# Patient Record
Sex: FEMALE | Race: OTHER | HISPANIC OR LATINO
[De-identification: names, ages, dates, MRNs, and addresses within clinical notes are randomized per-mention and may not be internally consistent; named-entity substitution may affect disease eponyms.]

---

## 2017-08-30 ENCOUNTER — RESULT REVIEW (OUTPATIENT)
Age: 32
End: 2017-08-30

## 2017-08-30 ENCOUNTER — TRANSCRIPTION ENCOUNTER (OUTPATIENT)
Age: 32
End: 2017-08-30

## 2017-08-30 ENCOUNTER — OUTPATIENT (OUTPATIENT)
Dept: OUTPATIENT SERVICES | Facility: HOSPITAL | Age: 32
LOS: 1 days | Discharge: HOME | End: 2017-08-30
Payer: COMMERCIAL

## 2017-08-30 VITALS
WEIGHT: 167.99 LBS | TEMPERATURE: 99 F | SYSTOLIC BLOOD PRESSURE: 117 MMHG | HEIGHT: 64 IN | HEART RATE: 82 BPM | RESPIRATION RATE: 12 BRPM | OXYGEN SATURATION: 100 % | DIASTOLIC BLOOD PRESSURE: 62 MMHG

## 2017-08-30 VITALS
RESPIRATION RATE: 12 BRPM | SYSTOLIC BLOOD PRESSURE: 107 MMHG | DIASTOLIC BLOOD PRESSURE: 71 MMHG | HEART RATE: 74 BPM | OXYGEN SATURATION: 97 %

## 2017-08-30 DIAGNOSIS — Z98.82 BREAST IMPLANT STATUS: Chronic | ICD-10-CM

## 2017-08-30 DIAGNOSIS — E89.0 POSTPROCEDURAL HYPOTHYROIDISM: Chronic | ICD-10-CM

## 2017-08-30 DIAGNOSIS — O02.1 MISSED ABORTION: ICD-10-CM

## 2017-08-30 LAB
BLD GP AB SCN SERPL QL: SIGNIFICANT CHANGE UP
HCT VFR BLD CALC: 39 % — SIGNIFICANT CHANGE UP (ref 34.5–45)
HGB BLD-MCNC: 13.2 G/DL — SIGNIFICANT CHANGE UP (ref 11.5–15.5)
MCHC RBC-ENTMCNC: 31.4 PG — SIGNIFICANT CHANGE UP (ref 27–34)
MCHC RBC-ENTMCNC: 33.7 GM/DL — SIGNIFICANT CHANGE UP (ref 32–36)
MCV RBC AUTO: 93.2 FL — SIGNIFICANT CHANGE UP (ref 80–100)
PLATELET # BLD AUTO: 253 K/UL — SIGNIFICANT CHANGE UP (ref 150–400)
RBC # BLD: 4.19 M/UL — SIGNIFICANT CHANGE UP (ref 3.8–5.2)
RBC # FLD: 11.6 % — SIGNIFICANT CHANGE UP (ref 10.3–14.5)
WBC # BLD: 6.7 K/UL — SIGNIFICANT CHANGE UP (ref 3.8–10.5)
WBC # FLD AUTO: 6.7 K/UL — SIGNIFICANT CHANGE UP (ref 3.8–10.5)

## 2017-08-30 PROCEDURE — 88300 SURGICAL PATH GROSS: CPT | Mod: 26,59

## 2017-08-30 PROCEDURE — 88305 TISSUE EXAM BY PATHOLOGIST: CPT | Mod: 26

## 2017-08-30 RX ORDER — OXYCODONE HYDROCHLORIDE 5 MG/1
5 TABLET ORAL EVERY 4 HOURS
Qty: 0 | Refills: 0 | Status: DISCONTINUED | OUTPATIENT
Start: 2017-08-30 | End: 2017-08-30

## 2017-08-30 RX ORDER — MEPERIDINE HYDROCHLORIDE 50 MG/ML
12.5 INJECTION INTRAMUSCULAR; INTRAVENOUS; SUBCUTANEOUS
Qty: 0 | Refills: 0 | Status: DISCONTINUED | OUTPATIENT
Start: 2017-08-30 | End: 2017-08-30

## 2017-08-30 RX ORDER — ONDANSETRON 8 MG/1
4 TABLET, FILM COATED ORAL ONCE
Qty: 0 | Refills: 0 | Status: DISCONTINUED | OUTPATIENT
Start: 2017-08-30 | End: 2017-09-12

## 2017-08-30 RX ORDER — SODIUM CHLORIDE 9 MG/ML
1000 INJECTION, SOLUTION INTRAVENOUS
Qty: 0 | Refills: 0 | Status: DISCONTINUED | OUTPATIENT
Start: 2017-08-30 | End: 2017-09-12

## 2017-08-30 RX ORDER — METOCLOPRAMIDE HCL 10 MG
10 TABLET ORAL ONCE
Qty: 0 | Refills: 0 | Status: DISCONTINUED | OUTPATIENT
Start: 2017-08-30 | End: 2017-09-12

## 2017-08-30 RX ORDER — HYDROMORPHONE HYDROCHLORIDE 2 MG/ML
0.5 INJECTION INTRAMUSCULAR; INTRAVENOUS; SUBCUTANEOUS
Qty: 0 | Refills: 0 | Status: DISCONTINUED | OUTPATIENT
Start: 2017-08-30 | End: 2017-08-30

## 2017-08-30 RX ADMIN — SODIUM CHLORIDE 75 MILLILITER(S): 9 INJECTION, SOLUTION INTRAVENOUS at 09:50

## 2017-08-30 RX ADMIN — HYDROMORPHONE HYDROCHLORIDE 0.5 MILLIGRAM(S): 2 INJECTION INTRAMUSCULAR; INTRAVENOUS; SUBCUTANEOUS at 12:33

## 2017-08-30 RX ADMIN — HYDROMORPHONE HYDROCHLORIDE 0.5 MILLIGRAM(S): 2 INJECTION INTRAMUSCULAR; INTRAVENOUS; SUBCUTANEOUS at 12:43

## 2017-08-30 NOTE — ASU DISCHARGE PLAN (ADULT/PEDIATRIC). - MEDICATION SUMMARY - MEDICATIONS TO TAKE
I will START or STAY ON the medications listed below when I get home from the hospital:    Motrin 800 mg oral tablet  -- 1 tab(s) by mouth 3 times a day, As Needed  -- Indication: For pain med    Tylenol 500 mg oral tablet  -- 2 tab(s) by mouth every 8 hours, As Needed  -- Indication: For pain med    Synthroid 200 mcg (0.2 mg) oral tablet  -- 1 tab(s) by mouth once a day  -- Indication: For Home med

## 2017-08-30 NOTE — ASU DISCHARGE PLAN (ADULT/PEDIATRIC). - ACTIVITY LEVEL
no douching/no sports/gym/no tampons/no tub baths/no exercise/no heavy lifting/no intercourse/nothing per vagina

## 2017-08-30 NOTE — ASU DISCHARGE PLAN (ADULT/PEDIATRIC). - INSTRUCTIONS
****Call the office with any problems including but not limited to heavy vaginal bleeding, fevers, severe abdominal pain, inability to eat/drink/urinate  **** Nothing in the vagina x2 weeks-( No sex, tampons, douching )  *****You may shower as usual but no hot tubs, bath tubs, swimming pools x2 weeks.  for urgent post op care please contact Narcisa at Dr. Grace's surgical hotline  [phone]

## 2017-08-30 NOTE — BRIEF OPERATIVE NOTE - PROCEDURE
D&C, therapeutic, for incomp, missed, septic, or induced , 1st trimester  2017    Active  [facility]1

## 2017-08-30 NOTE — ASU DISCHARGE PLAN (ADULT/PEDIATRIC). - NOTIFY
Unable to Urinate/Pain not relieved by Medications/Bleeding that does not stop/Inability to Tolerate Liquids or Foods/Fever greater than 101/Persistent Nausea and Vomiting/GYN Fever>100.4/Excessive Diarrhea

## 2017-08-31 LAB — SURGICAL PATHOLOGY FINAL REPORT - CH: SIGNIFICANT CHANGE UP

## 2017-09-01 DIAGNOSIS — O02.1 MISSED ABORTION: ICD-10-CM

## 2017-09-01 DIAGNOSIS — E03.9 HYPOTHYROIDISM, UNSPECIFIED: ICD-10-CM

## 2017-09-01 LAB — CHROM ANALY OVERALL INTERP SPEC-IMP: SIGNIFICANT CHANGE UP

## 2017-09-11 PROCEDURE — 88300 SURGICAL PATH GROSS: CPT

## 2017-09-11 PROCEDURE — 86850 RBC ANTIBODY SCREEN: CPT

## 2017-09-11 PROCEDURE — 85027 COMPLETE CBC AUTOMATED: CPT

## 2017-09-11 PROCEDURE — 86900 BLOOD TYPING SEROLOGIC ABO: CPT

## 2017-09-11 PROCEDURE — 88305 TISSUE EXAM BY PATHOLOGIST: CPT

## 2017-09-11 PROCEDURE — 59820 CARE OF MISCARRIAGE: CPT

## 2017-09-11 PROCEDURE — 86901 BLOOD TYPING SEROLOGIC RH(D): CPT

## 2018-04-17 PROBLEM — Z00.00 ENCOUNTER FOR PREVENTIVE HEALTH EXAMINATION: Noted: 2018-04-17

## 2018-04-30 ENCOUNTER — ASOB RESULT (OUTPATIENT)
Age: 33
End: 2018-04-30

## 2018-04-30 ENCOUNTER — APPOINTMENT (OUTPATIENT)
Dept: ANTEPARTUM | Facility: CLINIC | Age: 33
End: 2018-04-30
Payer: COMMERCIAL

## 2018-04-30 PROCEDURE — 36416 COLLJ CAPILLARY BLOOD SPEC: CPT

## 2018-04-30 PROCEDURE — 76801 OB US < 14 WKS SINGLE FETUS: CPT

## 2018-04-30 PROCEDURE — 76813 OB US NUCHAL MEAS 1 GEST: CPT

## 2018-06-25 ENCOUNTER — ASOB RESULT (OUTPATIENT)
Age: 33
End: 2018-06-25

## 2018-06-25 ENCOUNTER — APPOINTMENT (OUTPATIENT)
Dept: ANTEPARTUM | Facility: CLINIC | Age: 33
End: 2018-06-25
Payer: COMMERCIAL

## 2018-06-25 PROCEDURE — 76811 OB US DETAILED SNGL FETUS: CPT

## 2018-10-09 ENCOUNTER — OUTPATIENT (OUTPATIENT)
Dept: OUTPATIENT SERVICES | Facility: HOSPITAL | Age: 33
LOS: 1 days | End: 2018-10-09

## 2018-10-09 DIAGNOSIS — E89.0 POSTPROCEDURAL HYPOTHYROIDISM: Chronic | ICD-10-CM

## 2018-10-09 DIAGNOSIS — O26.899 OTHER SPECIFIED PREGNANCY RELATED CONDITIONS, UNSPECIFIED TRIMESTER: ICD-10-CM

## 2018-10-09 DIAGNOSIS — Z98.82 BREAST IMPLANT STATUS: Chronic | ICD-10-CM

## 2018-10-09 DIAGNOSIS — Z3A.00 WEEKS OF GESTATION OF PREGNANCY NOT SPECIFIED: ICD-10-CM

## 2018-10-09 LAB — AMNISURE ROM (RUPTURE OF MEMBRANES): NEGATIVE — SIGNIFICANT CHANGE UP

## 2018-11-13 ENCOUNTER — TRANSCRIPTION ENCOUNTER (OUTPATIENT)
Age: 33
End: 2018-11-13

## 2018-11-13 ENCOUNTER — INPATIENT (INPATIENT)
Facility: HOSPITAL | Age: 33
LOS: 3 days | Discharge: HOME | End: 2018-11-17
Attending: OBSTETRICS & GYNECOLOGY | Admitting: OBSTETRICS & GYNECOLOGY

## 2018-11-13 VITALS — WEIGHT: 164.91 LBS | HEIGHT: 65 IN

## 2018-11-13 DIAGNOSIS — Z98.82 BREAST IMPLANT STATUS: Chronic | ICD-10-CM

## 2018-11-13 DIAGNOSIS — O26.899 OTHER SPECIFIED PREGNANCY RELATED CONDITIONS, UNSPECIFIED TRIMESTER: ICD-10-CM

## 2018-11-13 DIAGNOSIS — Z3A.00 WEEKS OF GESTATION OF PREGNANCY NOT SPECIFIED: ICD-10-CM

## 2018-11-13 DIAGNOSIS — E89.0 POSTPROCEDURAL HYPOTHYROIDISM: Chronic | ICD-10-CM

## 2018-11-13 PROBLEM — E03.9 HYPOTHYROIDISM, UNSPECIFIED: Chronic | Status: ACTIVE | Noted: 2017-08-30

## 2018-11-13 LAB
BASOPHILS # BLD AUTO: 0.03 K/UL — SIGNIFICANT CHANGE UP (ref 0–0.2)
BASOPHILS NFR BLD AUTO: 0.3 % — SIGNIFICANT CHANGE UP (ref 0–2)
EOSINOPHIL # BLD AUTO: 0.13 K/UL — SIGNIFICANT CHANGE UP (ref 0–0.5)
EOSINOPHIL NFR BLD AUTO: 1.4 % — SIGNIFICANT CHANGE UP (ref 0–6)
HCT VFR BLD CALC: 36.7 % — SIGNIFICANT CHANGE UP (ref 34.5–45)
HGB BLD-MCNC: 12.7 G/DL — SIGNIFICANT CHANGE UP (ref 11.5–15.5)
IMM GRANULOCYTES # BLD AUTO: 0.03 # — SIGNIFICANT CHANGE UP
IMM GRANULOCYTES NFR BLD AUTO: 0.3 % — SIGNIFICANT CHANGE UP (ref 0–1.5)
LYMPHOCYTES # BLD AUTO: 1.75 K/UL — SIGNIFICANT CHANGE UP (ref 1–3.3)
LYMPHOCYTES # BLD AUTO: 18.5 % — SIGNIFICANT CHANGE UP (ref 13–44)
MCHC RBC-ENTMCNC: 31.4 PG — SIGNIFICANT CHANGE UP (ref 27–34)
MCHC RBC-ENTMCNC: 34.6 % — SIGNIFICANT CHANGE UP (ref 32–36)
MCV RBC AUTO: 90.8 FL — SIGNIFICANT CHANGE UP (ref 80–100)
MONOCYTES # BLD AUTO: 0.4 K/UL — SIGNIFICANT CHANGE UP (ref 0–0.9)
MONOCYTES NFR BLD AUTO: 4.2 % — SIGNIFICANT CHANGE UP (ref 2–14)
NEUTROPHILS # BLD AUTO: 7.13 K/UL — SIGNIFICANT CHANGE UP (ref 1.8–7.4)
NEUTROPHILS NFR BLD AUTO: 75.3 % — SIGNIFICANT CHANGE UP (ref 43–77)
NRBC # FLD: 0 — SIGNIFICANT CHANGE UP
PLATELET # BLD AUTO: 187 K/UL — SIGNIFICANT CHANGE UP (ref 150–400)
PMV BLD: 11.4 FL — SIGNIFICANT CHANGE UP (ref 7–13)
RBC # BLD: 4.04 M/UL — SIGNIFICANT CHANGE UP (ref 3.8–5.2)
RBC # FLD: 12.1 % — SIGNIFICANT CHANGE UP (ref 10.3–14.5)
WBC # BLD: 9.47 K/UL — SIGNIFICANT CHANGE UP (ref 3.8–10.5)
WBC # FLD AUTO: 9.47 K/UL — SIGNIFICANT CHANGE UP (ref 3.8–10.5)

## 2018-11-13 RX ORDER — SODIUM CHLORIDE 9 MG/ML
1000 INJECTION, SOLUTION INTRAVENOUS ONCE
Qty: 0 | Refills: 0 | Status: DISCONTINUED | OUTPATIENT
Start: 2018-11-13 | End: 2018-11-13

## 2018-11-13 RX ORDER — OXYTOCIN 10 UNIT/ML
333.3 VIAL (ML) INJECTION
Qty: 20 | Refills: 0 | Status: DISCONTINUED | OUTPATIENT
Start: 2018-11-13 | End: 2018-11-13

## 2018-11-13 RX ORDER — CITRIC ACID/SODIUM CITRATE 300-500 MG
15 SOLUTION, ORAL ORAL EVERY 4 HOURS
Qty: 0 | Refills: 0 | Status: DISCONTINUED | OUTPATIENT
Start: 2018-11-13 | End: 2018-11-13

## 2018-11-13 RX ORDER — INFLUENZA VIRUS VACCINE 15; 15; 15; 15 UG/.5ML; UG/.5ML; UG/.5ML; UG/.5ML
0.5 SUSPENSION INTRAMUSCULAR ONCE
Qty: 0 | Refills: 0 | Status: COMPLETED | OUTPATIENT
Start: 2018-11-13 | End: 2018-11-17

## 2018-11-13 RX ORDER — LEVOTHYROXINE SODIUM 125 MCG
200 TABLET ORAL DAILY
Qty: 0 | Refills: 0 | Status: DISCONTINUED | OUTPATIENT
Start: 2018-11-13 | End: 2018-11-14

## 2018-11-13 RX ORDER — SODIUM CHLORIDE 9 MG/ML
1000 INJECTION, SOLUTION INTRAVENOUS
Qty: 0 | Refills: 0 | Status: DISCONTINUED | OUTPATIENT
Start: 2018-11-13 | End: 2018-11-13

## 2018-11-13 RX ORDER — SODIUM CHLORIDE 9 MG/ML
1000 INJECTION, SOLUTION INTRAVENOUS ONCE
Qty: 0 | Refills: 0 | Status: COMPLETED | OUTPATIENT
Start: 2018-11-13 | End: 2018-11-14

## 2018-11-13 RX ORDER — CITRIC ACID/SODIUM CITRATE 300-500 MG
15 SOLUTION, ORAL ORAL EVERY 4 HOURS
Qty: 0 | Refills: 0 | Status: DISCONTINUED | OUTPATIENT
Start: 2018-11-13 | End: 2018-11-14

## 2018-11-13 RX ORDER — SODIUM CHLORIDE 9 MG/ML
1000 INJECTION, SOLUTION INTRAVENOUS
Qty: 0 | Refills: 0 | Status: DISCONTINUED | OUTPATIENT
Start: 2018-11-13 | End: 2018-11-14

## 2018-11-13 RX ORDER — OXYTOCIN 10 UNIT/ML
333.3 VIAL (ML) INJECTION
Qty: 20 | Refills: 0 | Status: DISCONTINUED | OUTPATIENT
Start: 2018-11-13 | End: 2018-11-14

## 2018-11-13 RX ORDER — LEVOTHYROXINE SODIUM 125 MCG
100 TABLET ORAL DAILY
Qty: 0 | Refills: 0 | Status: DISCONTINUED | OUTPATIENT
Start: 2018-11-13 | End: 2018-11-13

## 2018-11-13 RX ADMIN — SODIUM CHLORIDE 125 MILLILITER(S): 9 INJECTION, SOLUTION INTRAVENOUS at 17:13

## 2018-11-13 RX ADMIN — SODIUM CHLORIDE 125 MILLILITER(S): 9 INJECTION, SOLUTION INTRAVENOUS at 23:07

## 2018-11-13 NOTE — PATIENT PROFILE OB - AS DELIV COMPLICATIONS OB
prolonged rupture of membranes/meconium stained fluid/premature rupture of membranes prior to labor/abnormal fetal heart rate tracing

## 2018-11-14 ENCOUNTER — TRANSCRIPTION ENCOUNTER (OUTPATIENT)
Age: 33
End: 2018-11-14

## 2018-11-14 LAB
RH IG SCN BLD-IMP: POSITIVE — SIGNIFICANT CHANGE UP
T PALLIDUM AB TITR SER: NEGATIVE — SIGNIFICANT CHANGE UP

## 2018-11-14 RX ORDER — LEVOTHYROXINE SODIUM 125 MCG
200 TABLET ORAL DAILY
Qty: 0 | Refills: 0 | Status: DISCONTINUED | OUTPATIENT
Start: 2018-11-14 | End: 2018-11-14

## 2018-11-14 RX ORDER — OXYTOCIN 10 UNIT/ML
2 VIAL (ML) INJECTION
Qty: 30 | Refills: 0 | Status: DISCONTINUED | OUTPATIENT
Start: 2018-11-14 | End: 2018-11-14

## 2018-11-14 RX ORDER — FERROUS SULFATE 325(65) MG
325 TABLET ORAL DAILY
Qty: 0 | Refills: 0 | Status: DISCONTINUED | OUTPATIENT
Start: 2018-11-15 | End: 2018-11-17

## 2018-11-14 RX ORDER — OXYCODONE HYDROCHLORIDE 5 MG/1
5 TABLET ORAL
Qty: 0 | Refills: 0 | Status: COMPLETED | OUTPATIENT
Start: 2018-11-15 | End: 2018-11-22

## 2018-11-14 RX ORDER — GLYCERIN ADULT
1 SUPPOSITORY, RECTAL RECTAL AT BEDTIME
Qty: 0 | Refills: 0 | Status: DISCONTINUED | OUTPATIENT
Start: 2018-11-15 | End: 2018-11-17

## 2018-11-14 RX ORDER — ACETAMINOPHEN 500 MG
975 TABLET ORAL EVERY 6 HOURS
Qty: 0 | Refills: 0 | Status: DISCONTINUED | OUTPATIENT
Start: 2018-11-15 | End: 2018-11-17

## 2018-11-14 RX ORDER — DOCUSATE SODIUM 100 MG
100 CAPSULE ORAL
Qty: 0 | Refills: 0 | Status: DISCONTINUED | OUTPATIENT
Start: 2018-11-15 | End: 2018-11-17

## 2018-11-14 RX ORDER — HYDROMORPHONE HYDROCHLORIDE 2 MG/ML
0.5 INJECTION INTRAMUSCULAR; INTRAVENOUS; SUBCUTANEOUS
Qty: 0 | Refills: 0 | Status: DISCONTINUED | OUTPATIENT
Start: 2018-11-14 | End: 2018-11-14

## 2018-11-14 RX ORDER — LANOLIN
1 OINTMENT (GRAM) TOPICAL
Qty: 0 | Refills: 0 | Status: DISCONTINUED | OUTPATIENT
Start: 2018-11-15 | End: 2018-11-17

## 2018-11-14 RX ORDER — SIMETHICONE 80 MG/1
80 TABLET, CHEWABLE ORAL EVERY 4 HOURS
Qty: 0 | Refills: 0 | Status: DISCONTINUED | OUTPATIENT
Start: 2018-11-15 | End: 2018-11-17

## 2018-11-14 RX ORDER — OXYCODONE HYDROCHLORIDE 5 MG/1
5 TABLET ORAL EVERY 4 HOURS
Qty: 0 | Refills: 0 | Status: COMPLETED | OUTPATIENT
Start: 2018-11-15 | End: 2018-11-22

## 2018-11-14 RX ORDER — SODIUM CHLORIDE 9 MG/ML
1000 INJECTION, SOLUTION INTRAVENOUS ONCE
Qty: 0 | Refills: 0 | Status: COMPLETED | OUTPATIENT
Start: 2018-11-14 | End: 2018-11-14

## 2018-11-14 RX ORDER — TETANUS TOXOID, REDUCED DIPHTHERIA TOXOID AND ACELLULAR PERTUSSIS VACCINE, ADSORBED 5; 2.5; 8; 8; 2.5 [IU]/.5ML; [IU]/.5ML; UG/.5ML; UG/.5ML; UG/.5ML
0.5 SUSPENSION INTRAMUSCULAR ONCE
Qty: 0 | Refills: 0 | Status: DISCONTINUED | OUTPATIENT
Start: 2018-11-15 | End: 2018-11-17

## 2018-11-14 RX ORDER — KETOROLAC TROMETHAMINE 30 MG/ML
30 SYRINGE (ML) INJECTION EVERY 6 HOURS
Qty: 0 | Refills: 0 | Status: DISCONTINUED | OUTPATIENT
Start: 2018-11-15 | End: 2018-11-16

## 2018-11-14 RX ORDER — DIPHENHYDRAMINE HCL 50 MG
25 CAPSULE ORAL EVERY 6 HOURS
Qty: 0 | Refills: 0 | Status: DISCONTINUED | OUTPATIENT
Start: 2018-11-15 | End: 2018-11-17

## 2018-11-14 RX ORDER — BUTORPHANOL TARTRATE 2 MG/ML
2 INJECTION, SOLUTION INTRAMUSCULAR; INTRAVENOUS ONCE
Qty: 0 | Refills: 0 | Status: DISCONTINUED | OUTPATIENT
Start: 2018-11-14 | End: 2018-11-14

## 2018-11-14 RX ORDER — DIPHENOXYLATE HCL/ATROPINE 2.5-.025MG
2 TABLET ORAL ONCE
Qty: 0 | Refills: 0 | Status: DISCONTINUED | OUTPATIENT
Start: 2018-11-14 | End: 2018-11-14

## 2018-11-14 RX ORDER — HEPARIN SODIUM 5000 [USP'U]/ML
5000 INJECTION INTRAVENOUS; SUBCUTANEOUS EVERY 12 HOURS
Qty: 0 | Refills: 0 | Status: DISCONTINUED | OUTPATIENT
Start: 2018-11-15 | End: 2018-11-17

## 2018-11-14 RX ORDER — IBUPROFEN 200 MG
600 TABLET ORAL EVERY 6 HOURS
Qty: 0 | Refills: 0 | Status: COMPLETED | OUTPATIENT
Start: 2018-11-15 | End: 2019-10-14

## 2018-11-14 RX ORDER — OXYTOCIN 10 UNIT/ML
41.67 VIAL (ML) INJECTION
Qty: 20 | Refills: 0 | Status: DISCONTINUED | OUTPATIENT
Start: 2018-11-14 | End: 2018-11-15

## 2018-11-14 RX ADMIN — BUTORPHANOL TARTRATE 2 MILLIGRAM(S): 2 INJECTION, SOLUTION INTRAMUSCULAR; INTRAVENOUS at 05:29

## 2018-11-14 RX ADMIN — SODIUM CHLORIDE 2000 MILLILITER(S): 9 INJECTION, SOLUTION INTRAVENOUS at 07:00

## 2018-11-14 RX ADMIN — Medication 200 MICROGRAM(S): at 08:04

## 2018-11-14 RX ADMIN — BUTORPHANOL TARTRATE 2 MILLIGRAM(S): 2 INJECTION, SOLUTION INTRAMUSCULAR; INTRAVENOUS at 04:47

## 2018-11-14 RX ADMIN — HYDROMORPHONE HYDROCHLORIDE 0.5 MILLIGRAM(S): 2 INJECTION INTRAMUSCULAR; INTRAVENOUS; SUBCUTANEOUS at 23:51

## 2018-11-14 RX ADMIN — Medication 2 MILLIUNIT(S)/MIN: at 11:11

## 2018-11-14 RX ADMIN — SODIUM CHLORIDE 2000 MILLILITER(S): 9 INJECTION, SOLUTION INTRAVENOUS at 21:00

## 2018-11-14 RX ADMIN — Medication 125 MILLIUNIT(S)/MIN: at 23:15

## 2018-11-14 RX ADMIN — Medication 2 TABLET(S): at 20:50

## 2018-11-15 LAB
HCT VFR BLD CALC: 29.8 % — LOW (ref 34.5–45)
HCT VFR BLD CALC: 35.6 % — SIGNIFICANT CHANGE UP (ref 34.5–45)
HGB BLD-MCNC: 10.3 G/DL — LOW (ref 11.5–15.5)
HGB BLD-MCNC: 12.1 G/DL — SIGNIFICANT CHANGE UP (ref 11.5–15.5)
MCHC RBC-ENTMCNC: 31.3 PG — SIGNIFICANT CHANGE UP (ref 27–34)
MCHC RBC-ENTMCNC: 32 PG — SIGNIFICANT CHANGE UP (ref 27–34)
MCHC RBC-ENTMCNC: 34 % — SIGNIFICANT CHANGE UP (ref 32–36)
MCHC RBC-ENTMCNC: 34.6 % — SIGNIFICANT CHANGE UP (ref 32–36)
MCV RBC AUTO: 92 FL — SIGNIFICANT CHANGE UP (ref 80–100)
MCV RBC AUTO: 92.5 FL — SIGNIFICANT CHANGE UP (ref 80–100)
NRBC # FLD: 0 — SIGNIFICANT CHANGE UP
NRBC # FLD: 0 — SIGNIFICANT CHANGE UP
PLATELET # BLD AUTO: 176 K/UL — SIGNIFICANT CHANGE UP (ref 150–400)
PLATELET # BLD AUTO: 180 K/UL — SIGNIFICANT CHANGE UP (ref 150–400)
PMV BLD: 11.3 FL — SIGNIFICANT CHANGE UP (ref 7–13)
PMV BLD: 11.4 FL — SIGNIFICANT CHANGE UP (ref 7–13)
RBC # BLD: 3.22 M/UL — LOW (ref 3.8–5.2)
RBC # BLD: 3.87 M/UL — SIGNIFICANT CHANGE UP (ref 3.8–5.2)
RBC # FLD: 11.9 % — SIGNIFICANT CHANGE UP (ref 10.3–14.5)
RBC # FLD: 12.2 % — SIGNIFICANT CHANGE UP (ref 10.3–14.5)
WBC # BLD: 13.6 K/UL — HIGH (ref 3.8–10.5)
WBC # BLD: 18.38 K/UL — HIGH (ref 3.8–10.5)
WBC # FLD AUTO: 13.6 K/UL — HIGH (ref 3.8–10.5)
WBC # FLD AUTO: 18.38 K/UL — HIGH (ref 3.8–10.5)

## 2018-11-15 RX ORDER — ACETAMINOPHEN 500 MG
3 TABLET ORAL
Qty: 0 | Refills: 0 | DISCHARGE
Start: 2018-11-15

## 2018-11-15 RX ORDER — ACETAMINOPHEN 500 MG
2 TABLET ORAL
Qty: 0 | Refills: 0 | COMMUNITY

## 2018-11-15 RX ORDER — IBUPROFEN 200 MG
600 TABLET ORAL EVERY 6 HOURS
Qty: 0 | Refills: 0 | Status: DISCONTINUED | OUTPATIENT
Start: 2018-11-15 | End: 2018-11-17

## 2018-11-15 RX ORDER — FERROUS SULFATE 325(65) MG
0 TABLET ORAL
Qty: 0 | Refills: 0 | DISCHARGE
Start: 2018-11-15

## 2018-11-15 RX ORDER — LEVOTHYROXINE SODIUM 125 MCG
200 TABLET ORAL DAILY
Qty: 0 | Refills: 0 | Status: DISCONTINUED | OUTPATIENT
Start: 2018-11-15 | End: 2018-11-17

## 2018-11-15 RX ORDER — SODIUM CHLORIDE 9 MG/ML
1000 INJECTION, SOLUTION INTRAVENOUS
Qty: 0 | Refills: 0 | Status: DISCONTINUED | OUTPATIENT
Start: 2018-11-15 | End: 2018-11-15

## 2018-11-15 RX ORDER — DOCUSATE SODIUM 100 MG
1 CAPSULE ORAL
Qty: 0 | Refills: 0 | DISCHARGE
Start: 2018-11-15

## 2018-11-15 RX ADMIN — Medication 30 MILLIGRAM(S): at 02:36

## 2018-11-15 RX ADMIN — HYDROMORPHONE HYDROCHLORIDE 0.5 MILLIGRAM(S): 2 INJECTION INTRAMUSCULAR; INTRAVENOUS; SUBCUTANEOUS at 00:10

## 2018-11-15 RX ADMIN — Medication 30 MILLIGRAM(S): at 03:30

## 2018-11-15 RX ADMIN — Medication 975 MILLIGRAM(S): at 02:35

## 2018-11-15 RX ADMIN — Medication 100 MILLIGRAM(S): at 13:16

## 2018-11-15 RX ADMIN — Medication 600 MILLIGRAM(S): at 21:47

## 2018-11-15 RX ADMIN — Medication 975 MILLIGRAM(S): at 20:45

## 2018-11-15 RX ADMIN — Medication 975 MILLIGRAM(S): at 03:30

## 2018-11-15 RX ADMIN — HEPARIN SODIUM 5000 UNIT(S): 5000 INJECTION INTRAVENOUS; SUBCUTANEOUS at 13:15

## 2018-11-15 RX ADMIN — Medication 600 MILLIGRAM(S): at 13:16

## 2018-11-15 RX ADMIN — Medication 200 MICROGRAM(S): at 07:26

## 2018-11-15 RX ADMIN — Medication 1 TABLET(S): at 13:15

## 2018-11-15 RX ADMIN — Medication 975 MILLIGRAM(S): at 21:47

## 2018-11-15 RX ADMIN — Medication 975 MILLIGRAM(S): at 13:15

## 2018-11-15 RX ADMIN — HEPARIN SODIUM 5000 UNIT(S): 5000 INJECTION INTRAVENOUS; SUBCUTANEOUS at 02:35

## 2018-11-15 RX ADMIN — Medication 600 MILLIGRAM(S): at 13:46

## 2018-11-15 RX ADMIN — Medication 600 MILLIGRAM(S): at 20:47

## 2018-11-15 RX ADMIN — Medication 325 MILLIGRAM(S): at 13:16

## 2018-11-15 RX ADMIN — Medication 975 MILLIGRAM(S): at 13:45

## 2018-11-15 NOTE — PROGRESS NOTE ADULT - PROBLEM SELECTOR PLAN 1
- Continue regular diet.  - Increase ambulation.  - Continue motrin, tylenol, oxycodone PRN for pain control.  - F/u AM CBC    Charlette Wynn, PGY-1  Pager# [phone]

## 2018-11-15 NOTE — DISCHARGE NOTE OB - PATIENT PORTAL LINK FT
You can access the [facility] Patient Portal, offered by [facility], by registering with the following website: [email or web address]

## 2018-11-15 NOTE — DISCHARGE NOTE OB - CARE PROVIDER_API CALL
Katherine Rodas), Gynecology Obstetrics  Gynecology  [address] [place] [address]  Phone: [phone]  Fax: [phone]

## 2018-11-15 NOTE — DISCHARGE NOTE OB - CARE PLAN
Principal Discharge DX:	 delivery delivered  Goal:	recovery  Assessment and plan of treatment:	routine postop/postpartum care

## 2018-11-15 NOTE — PROGRESS NOTE ADULT - ASSESSMENT
A/P: 34yo POD#1 s/p LTCS for NRFHT. EBL 1200 2/2 atony s/p Methergine x1, Hemabate x1.  Patient is stable and doing well post-operatively.

## 2018-11-15 NOTE — DISCHARGE NOTE OB - HOSPITAL COURSE
patient received an urgent primary  section for fetal bradycardia.  a viable female  APGARs 8/9 was delivered. Surgery was uncomplicated. patient was transferred to maternity unit for routine postop/postpartum care. patient received heparin for dvt ppx. hospital course was unremarkable. she was stable for discharge on POD3. she is to follow up outpatient in 1-2 weeks for wound check

## 2018-11-15 NOTE — DISCHARGE NOTE OB - MEDICATION SUMMARY - MEDICATIONS TO TAKE
I will START or STAY ON the medications listed below when I get home from the hospital:    Motrin 800 mg oral tablet  -- 1 tab(s) by mouth 3 times a day, As Needed  -- Indication: For pain     acetaminophen 325 mg oral tablet  -- 3 tab(s) by mouth every 6 hours  -- Indication: For pain     Prenatal Multivitamins with Folic Acid 1 mg oral tablet  -- 1 tab(s) by mouth once a day  -- Indication: For nutrition     Feosol 200 mg (65 mg elemental iron) oral tablet  --  by mouth   -- Indication: For anemia     docusate sodium 100 mg oral capsule  -- 1 cap(s) by mouth 2 times a day, As needed, Stool Softening  -- Indication: For iron     Synthroid 200 mcg (0.2 mg) oral tablet  -- 1 tab(s) by mouth once a day  -- Indication: For hypothyroidism

## 2018-11-15 NOTE — PROGRESS NOTE ADULT - SUBJECTIVE AND OBJECTIVE BOX
OB Progress Note:  Delivery, POD#1    S: 32yo POD#1 s/p LTCS for NRFHT. Her pain is well controlled. She is tolerating a regular diet. Not yet passing flatus. Pagan removed 4hrs ago, due to void this AM. Denies N/V. Denies CP/SOB/lightheadedness/dizziness. She is ambulating without difficulty.     O:   Vital Signs Last 24 Hrs  T(C): 36.8 (15 Nov 2018 06:47), Max: 37.1 (2018 20:50)  T(F): 98.2 (15 Nov 2018 06:47), Max: 98.8 (2018 20:50)  HR: 97 (15 Nov 2018 06:47) (76 - 97)  BP: 119/60 (15 Nov 2018 06:47) (110/62 - 140/80)  BP(mean): 92 (2018 23:00) (71 - 105)  RR: 18 (15 Nov 2018 06:47) (13 - 20)  SpO2: 99% (15 Nov 2018 06:47) (97% - 99%)    Labs:  Blood type: A Positive  Rubella IgG: RPR: Negative                          12.1   18.38<H> >-----------< 180    ( 14 @ 23:45 )             35.6                        12.7   9.47 >-----------< 187    (  @ 14:30 )             36.7        PE:  General: NAD  Abdomen: Mildly distended, appropriately tender, incision c/d/i.  Extremities: No erythema, no pitting edema

## 2018-11-16 RX ORDER — OXYCODONE HYDROCHLORIDE 5 MG/1
5 TABLET ORAL EVERY 4 HOURS
Qty: 0 | Refills: 0 | Status: DISCONTINUED | OUTPATIENT
Start: 2018-11-16 | End: 2018-11-17

## 2018-11-16 RX ORDER — OXYCODONE HYDROCHLORIDE 5 MG/1
5 TABLET ORAL
Qty: 0 | Refills: 0 | Status: DISCONTINUED | OUTPATIENT
Start: 2018-11-16 | End: 2018-11-17

## 2018-11-16 RX ADMIN — Medication 600 MILLIGRAM(S): at 17:58

## 2018-11-16 RX ADMIN — Medication 600 MILLIGRAM(S): at 06:46

## 2018-11-16 RX ADMIN — Medication 325 MILLIGRAM(S): at 11:49

## 2018-11-16 RX ADMIN — Medication 975 MILLIGRAM(S): at 11:48

## 2018-11-16 RX ADMIN — HEPARIN SODIUM 5000 UNIT(S): 5000 INJECTION INTRAVENOUS; SUBCUTANEOUS at 15:13

## 2018-11-16 RX ADMIN — Medication 600 MILLIGRAM(S): at 11:49

## 2018-11-16 RX ADMIN — OXYCODONE HYDROCHLORIDE 5 MILLIGRAM(S): 5 TABLET ORAL at 08:48

## 2018-11-16 RX ADMIN — Medication 1 TABLET(S): at 11:49

## 2018-11-16 RX ADMIN — Medication 975 MILLIGRAM(S): at 05:46

## 2018-11-16 RX ADMIN — SIMETHICONE 80 MILLIGRAM(S): 80 TABLET, CHEWABLE ORAL at 20:25

## 2018-11-16 RX ADMIN — Medication 600 MILLIGRAM(S): at 12:48

## 2018-11-16 RX ADMIN — Medication 975 MILLIGRAM(S): at 17:58

## 2018-11-16 RX ADMIN — Medication 975 MILLIGRAM(S): at 18:31

## 2018-11-16 RX ADMIN — OXYCODONE HYDROCHLORIDE 5 MILLIGRAM(S): 5 TABLET ORAL at 01:16

## 2018-11-16 RX ADMIN — Medication 600 MILLIGRAM(S): at 05:46

## 2018-11-16 RX ADMIN — HEPARIN SODIUM 5000 UNIT(S): 5000 INJECTION INTRAVENOUS; SUBCUTANEOUS at 01:18

## 2018-11-16 RX ADMIN — OXYCODONE HYDROCHLORIDE 5 MILLIGRAM(S): 5 TABLET ORAL at 02:16

## 2018-11-16 RX ADMIN — Medication 200 MICROGRAM(S): at 05:47

## 2018-11-16 RX ADMIN — Medication 975 MILLIGRAM(S): at 12:48

## 2018-11-16 RX ADMIN — Medication 975 MILLIGRAM(S): at 06:46

## 2018-11-16 RX ADMIN — OXYCODONE HYDROCHLORIDE 5 MILLIGRAM(S): 5 TABLET ORAL at 09:45

## 2018-11-16 RX ADMIN — Medication 600 MILLIGRAM(S): at 18:31

## 2018-11-16 NOTE — PROGRESS NOTE ADULT - SUBJECTIVE AND OBJECTIVE BOX
Post-Operative Note, C/S  She is a  33y woman who is now post-operative day #2:     Subjective:  The patient feels well.  She is ambulating.   She is tolerating regular diet.  She denies nausea and vomiting; denies fever.  She is voiding.  Her pain is controlled; incisional pain is appropriate.  She reports normal postpartum bleeding.  She is breastfeeding.  She is formula feeding.    Physical exam:    Vital Signs Last 24 Hrs  T(C): 36.8 (16 Nov 2018 10:00), Max: 36.9 (15 Nov 2018 22:24)  T(F): 98.3 (16 Nov 2018 10:00), Max: 98.4 (15 Nov 2018 22:24)  HR: 84 (16 Nov 2018 10:00) (83 - 95)  BP: 126/65 (16 Nov 2018 10:00) (114/51 - 126/65)  BP(mean): --  RR: 17 (16 Nov 2018 10:00) (17 - 18)  SpO2: 97% (16 Nov 2018 10:00) (97% - 100%)    Gen: NAD  Breast: Soft, nontender, not engorged.  Abdomen: Soft, nontender, no distension , firm uterine fundus at umbilicus.  Incision: C/D/I.  Pelvic: Normal lochia noted  Ext: No calf tenderness    LABS:                        10.3   13.60 )-----------( 176      ( 15 Nov 2018 11:59 )             29.8       Rubella status:     Allergies    No Known Allergies    Intolerances      MEDICATIONS  (STANDING):  acetaminophen   Tablet .. 975 milliGRAM(s) Oral every 6 hours  diphtheria/tetanus/pertussis (acellular) Vaccine (ADAcel) 0.5 milliLiter(s) IntraMuscular once  ferrous    sulfate 325 milliGRAM(s) Oral daily  heparin  Injectable 5000 Unit(s) SubCutaneous every 12 hours  ibuprofen  Tablet. 600 milliGRAM(s) Oral every 6 hours  influenza   Vaccine 0.5 milliLiter(s) IntraMuscular once  levothyroxine 200 MICROGram(s) Oral daily  oxyCODONE    IR 5 milliGRAM(s) Oral every 3 hours  prenatal multivitamin 1 Tablet(s) Oral daily    MEDICATIONS  (PRN):  diphenhydrAMINE 25 milliGRAM(s) Oral every 6 hours PRN Itching  docusate sodium 100 milliGRAM(s) Oral two times a day PRN Stool Softening  glycerin Suppository - Adult 1 Suppository(s) Rectal at bedtime PRN Constipation  lanolin Ointment 1 Application(s) Topical every 3 hours PRN Sore Nipples  oxyCODONE    IR 5 milliGRAM(s) Oral every 4 hours PRN Severe Pain (7 - 10)  simethicone 80 milliGRAM(s) Chew every 4 hours PRN Gas        Assessment and Plan  POD #2 s/p C/S.  Doing well.  Encourage ambulation.  Incisional care and PO instructions reviewed.  CPC.

## 2018-11-17 VITALS
OXYGEN SATURATION: 99 % | TEMPERATURE: 98 F | RESPIRATION RATE: 17 BRPM | DIASTOLIC BLOOD PRESSURE: 73 MMHG | HEART RATE: 71 BPM | SYSTOLIC BLOOD PRESSURE: 130 MMHG

## 2018-11-17 RX ADMIN — Medication 975 MILLIGRAM(S): at 06:20

## 2018-11-17 RX ADMIN — Medication 600 MILLIGRAM(S): at 00:30

## 2018-11-17 RX ADMIN — INFLUENZA VIRUS VACCINE 0.5 MILLILITER(S): 15; 15; 15; 15 SUSPENSION INTRAMUSCULAR at 06:20

## 2018-11-17 RX ADMIN — Medication 975 MILLIGRAM(S): at 00:31

## 2018-11-17 RX ADMIN — Medication 600 MILLIGRAM(S): at 07:20

## 2018-11-17 RX ADMIN — Medication 975 MILLIGRAM(S): at 01:31

## 2018-11-17 RX ADMIN — SIMETHICONE 80 MILLIGRAM(S): 80 TABLET, CHEWABLE ORAL at 09:58

## 2018-11-17 RX ADMIN — Medication 600 MILLIGRAM(S): at 06:20

## 2018-11-17 RX ADMIN — Medication 975 MILLIGRAM(S): at 07:20

## 2018-11-17 RX ADMIN — Medication 600 MILLIGRAM(S): at 01:31

## 2018-11-17 RX ADMIN — HEPARIN SODIUM 5000 UNIT(S): 5000 INJECTION INTRAVENOUS; SUBCUTANEOUS at 04:15

## 2018-11-17 NOTE — PROGRESS NOTE ADULT - SUBJECTIVE AND OBJECTIVE BOX
Post-Operative Note, C/S  She is a  33y woman who is now post-operative day: 3    Subjective:  The patient feels well.  She is ambulating.   She is tolerating regular diet.  She denies nausea and vomiting; denies fever.  She is voiding.  Her pain is controlled; incisional pain is appropriate.  She reports normal postpartum bleeding.  She is breastfeeding.  She is formula feeding.    Physical exam:    Vital Signs Last 24 Hrs  T(C): 36.6 (17 Nov 2018 06:00), Max: 36.7 (16 Nov 2018 21:04)  T(F): 97.9 (17 Nov 2018 06:00), Max: 98 (16 Nov 2018 21:04)  HR: 71 (17 Nov 2018 06:00) (71 - 92)  BP: 130/73 (17 Nov 2018 06:00) (116/64 - 130/73)  BP(mean): --  RR: 17 (17 Nov 2018 06:00) (17 - 19)  SpO2: 99% (17 Nov 2018 06:00) (99% - 100%)    Gen: NAD  Breast: Soft, nontender, not engorged.  Abdomen: Soft, nontender, no distension , firm uterine fundus at umbilicus.  Incision: C/D/I.  Pelvic: Normal lochia noted  Ext: No calf tenderness    LABS:                        10.3   13.60 )-----------( 176      ( 15 Nov 2018 11:59 )             29.8       Rubella status:     Allergies    No Known Allergies    Intolerances      MEDICATIONS  (STANDING):  acetaminophen   Tablet .. 975 milliGRAM(s) Oral every 6 hours  diphtheria/tetanus/pertussis (acellular) Vaccine (ADAcel) 0.5 milliLiter(s) IntraMuscular once  ferrous    sulfate 325 milliGRAM(s) Oral daily  heparin  Injectable 5000 Unit(s) SubCutaneous every 12 hours  ibuprofen  Tablet. 600 milliGRAM(s) Oral every 6 hours  levothyroxine 200 MICROGram(s) Oral daily  oxyCODONE    IR 5 milliGRAM(s) Oral every 3 hours  prenatal multivitamin 1 Tablet(s) Oral daily    MEDICATIONS  (PRN):  diphenhydrAMINE 25 milliGRAM(s) Oral every 6 hours PRN Itching  docusate sodium 100 milliGRAM(s) Oral two times a day PRN Stool Softening  glycerin Suppository - Adult 1 Suppository(s) Rectal at bedtime PRN Constipation  lanolin Ointment 1 Application(s) Topical every 3 hours PRN Sore Nipples  oxyCODONE    IR 5 milliGRAM(s) Oral every 4 hours PRN Severe Pain (7 - 10)  simethicone 80 milliGRAM(s) Chew every 4 hours PRN Gas        Assessment and Plan  POD #3 s/p C/S.  Doing well.  Encourage ambulation.  Incisional care and PO instructions reviewed.  CPC.  Discharge home today.

## 2018-11-17 NOTE — PROGRESS NOTE ADULT - SUBJECTIVE AND OBJECTIVE BOX
Patient assessed at 0857.  Subjective  Pain: Patient denies any pain at the time of assessment. Pain being managed well by pain management protocol.  Complaints: None. Patient denies any headache, blur vision and/or dizziness  Milestones:  Alert and orientedx3. Out of bed ambulating. Positive flatus. Negative bowel movement, denies the urge. Voiding.  Tolerating a regular diet.  Infant feeding: breastfeeding and formula feeding  Feeding related issues and/or concerns: has issues with latching seen by lactation    Objective  Vital Signs:  Vital Signs Last 24 Hrs  T(C): 36.6 (2018 06:00), Max: 36.8 (2018 10:00)  T(F): 97.9 (2018 06:00), Max: 98.3 (2018 10:00)  HR: 71 (2018 06:00) (71 - 92)  BP: 130/73 (2018 06:00) (116/64 - 130/73)  BP(mean): --  RR: 17 (2018 06:00) (17 - 19)  SpO2: 99% (2018 06:00) (97% - 100%)    Labs:                        10.3   13.60 )-----------( 176      ( 15 Nov 2018 11:59 )             29.8     Blood Type: Apositive  Rubella: Immune  RPR: nonreactive    Medications  MEDICATIONS  (STANDING):  acetaminophen   Tablet .. 975 milliGRAM(s) Oral every 6 hours  diphtheria/tetanus/pertussis (acellular) Vaccine (ADAcel) 0.5 milliLiter(s) IntraMuscular once  ferrous    sulfate 325 milliGRAM(s) Oral daily  heparin  Injectable 5000 Unit(s) SubCutaneous every 12 hours  ibuprofen  Tablet. 600 milliGRAM(s) Oral every 6 hours  levothyroxine 200 MICROGram(s) Oral daily  oxyCODONE    IR 5 milliGRAM(s) Oral every 3 hours  prenatal multivitamin 1 Tablet(s) Oral daily    MEDICATIONS  (PRN):  diphenhydrAMINE 25 milliGRAM(s) Oral every 6 hours PRN Itching  docusate sodium 100 milliGRAM(s) Oral two times a day PRN Stool Softening  glycerin Suppository - Adult 1 Suppository(s) Rectal at bedtime PRN Constipation  lanolin Ointment 1 Application(s) Topical every 3 hours PRN Sore Nipples  oxyCODONE    IR 5 milliGRAM(s) Oral every 4 hours PRN Severe Pain (7 - 10)  simethicone 80 milliGRAM(s) Chew every 4 hours PRN Gas    Assessment  32y/o     Day#3    primary post-operative  section delivery for abnormal fetal status, arrest of descent                                        Condition: Stable  Past Medical History significant for: thyroidectomy, breast augmentation  Current Issues: EBL 1200cc  Lung sounds clear bilaterally.  Breasts: soft, nontender  Nipples: intact  Abdomen: Soft, nondistended and nontender. Bowel sounds present. Fundus firm  Abdominal incision: Clean, dry and intact with dermabond.   Vaginal: Lochia light rubra  Extremities: Moderate edema noted bilaterally to lower extremities, negative Wilma's Sign, nontender. Positive pedal pulses  Other relevant physical exam findings: none    Plan  Continue routine post-operative and postpartum care  Increase ambulation, analgesia PRN and pain medication protocol standing oxycodone, ibuprofen and acetaminophen.  Encouraged to wear abdominal binder for support and to use incentive spirometer  Discussed breast/nipple care and breastfeeding.  Discharge to home today. Discussed discharge planning.

## 2020-02-06 NOTE — ASU PATIENT PROFILE, ADULT - PRO TOBACCO TYPE
It was a pleasure working with you today!  I hope your condition improves rapidly!     Please continue your Omnicef antibiotic therapy that you have at home.  Your next dose of this would be tomorrow morning.  Your urine culture results should return in the next 36 hours, and you will be contacted if the culture shows that you need a different antibiotic.  I think the majority of your pain is due to the amount of swelling that is present in your kidney.  I want you to take ibuprofen 600 mg every 6 hours with food on a regular basis over the next 4 to 5 days to balance the inflammation out.  If your pain is not being controlled by the ibuprofen, you can use the hydrocodone for breakthrough pain.  Please do not drive for 8 hours after taking this medication, as it can sedate you and impair your judgment.    Please call Dr. Baez's office tomorrow to get an appointment set up for next week to recheck your condition.  Please do not hesitate to return to the ED prior to then if you develop worsening pain, fevers, or any worsening symptoms.    Make sure that you are drinking at least 10 glasses of water daily to maintain adequate hydration and flush your urinary tract.     cigarettes

## 2021-02-02 NOTE — PATIENT PROFILE OB - PRO HIV INFANT
From: Wesley Hector  To: Meño Liu  Sent: 2/2/2021 12:40 PM CST  Subject: After-Visit Question    Hello Dr. Reese, I wanted to follow up on my recent appointment regarding claucoma investigation. Should I be scheduling additional testing? Thank you, Wesley Hector   negative

## 2021-09-14 NOTE — PACU DISCHARGE NOTE - HYDRATION STATUS:
Federica Rivera is a 39 year old female presenting for a complete physical, follow-up diabetes, hyperlipidemia, hypertension, history of elevated LFTs, obesity. G 2 , P 2 .  Last Pap hysterectomy 2014.  Feels well.  Has no concerns regarding her health.  Not checking feet routinely. Had an eye exam in the past year.  Exercising 2 x a week, walking.   No chest pain no dyspnea.  Does SBE      Problem List:  Patient Active Problem List   Diagnosis   • Benign essential hypertension   • Hyperlipidemia, mixed   • Elevated LFTs   • Obesity (BMI 30-39.9)   • Type 2 diabetes mellitus without complication, without long-term current use of insulin (CMS/[facility])       Current Outpatient Medications   Medication Sig Dispense Refill   • metFORMIN (GLUCOPHAGE) 500 MG tablet Take 1 tablet by mouth daily with breakfast for diabetes 90 tablet 3   • atorvastatin (LIPITOR) 20 MG tablet Take 1 tablet by mouth daily. 90 tablet 3   • amLODIPine (NORVASC) 10 MG tablet Take 1 tablet by mouth daily. Indications: High Blood Pressure Disorder 90 tablet 3   • losartan (COZAAR) 25 MG tablet TAKE 1 TABLET BY MOUTH DAILY FOR DIABETES MELLITUS OR HYPERTENSION 90 tablet 3   • blood glucose meter Test blood sugar 2 times daily as directed. Diagnosis: E11.9. Meter: Covered by insurance 1 kit 0   • blood glucose test strip Test blood sugar 2 times daily as directed. Diagnosis: E11.9. To match meter covered by insurance. 100 each 3   • Lancets Misc Use 1 clean lancet to check blood glucose up to 2 times daily. 100 each 3     No current facility-administered medications for this visit.       Past Medical History:   Diagnosis Date   • Diabetes mellitus (CMS/HCC)    • Essential (primary) hypertension    • Ovarian cyst    • Prediabetes        Past Surgical History:   Procedure Laterality Date   • Hysterectomy      TVH, LSO for endometrioma   • [place] tooth extraction         Family History   Problem Relation Age of Onset   • Hypertension Mother    •  Hyperlipidemia Mother    • Diabetes Father    • Patient is unaware of any medical problems Son    • Patient is unaware of any medical problems Son    • Other Sister         Multiple sclerosis   • Patient is unaware of any medical problems Brother    • Patient is unaware of any medical problems Sister        Social History     Socioeconomic History   • Marital status: /Civil Union     Spouse name: William   • Number of children: 2   • Years of education: Not on file   • Highest education level: Not on file   Occupational History   • Occupation: supervisor pre service     Employer: [employer] ( [employer])   Tobacco Use   • Smoking status: Never Smoker   • Smokeless tobacco: Never Used   Substance and Sexual Activity   • Alcohol use: No     Comment: rarely   • Drug use: No   • Sexual activity: Yes     Partners: Male   Other Topics Concern   •  Service No   • Blood Transfusions No   • Caffeine Concern Yes     Comment: 1 soda per day   • Occupational Exposure Not Asked   • Hobby Hazards Not Asked   • Sleep Concern Not Asked   • Stress Concern Not Asked   • Weight Concern Not Asked   • Special Diet Not Asked   • Back Care Not Asked   • Exercise No   • Bike Helmet Not Asked   • Seat Belt Yes   • Self-Exams Yes   Social History Narrative   • Not on file     Social Determinants of Health     Financial Resource Strain:    • Social Determinants: Financial Resource Strain:    Food Insecurity:    • Social Determinants: Food Insecurity:    Transportation Needs:    • Lack of Transportation (Medical):    • Lack of Transportation (Non-Medical):    Physical Activity:    • Days of Exercise per Week:    • Minutes of Exercise per Session:    Stress:    • Social Determinants: Stress:    Social Connections:    • Social Determinants: Social Connections:    Intimate Partner Violence:    • Social Determinants: Intimate Partner Violence Past Fear:    • Social Determinants: Intimate Partner Violence Current Fear:         REVIEW OF SYSTEMS: The patient denies symptoms of:   General: Fever, chills, night sweats, fatigue, weight loss, weight gain and decreased appetite.  Skin: Rash, itching, lumps or bumps or moles that are changing, hair changes and nail changes.  Eyes: Visual blurring, double vision, spots before the eyes and eye pain.  Ears: Decreased auditory acuity, ringing or tinnitus in the ears, pain in the ears and discharge from the ears.  Nose: Nose bleed, discharge from the nose.  Mouth: Soreness of the mouth or tongue or lips and abnormality of the teeth or gums.  Throat: Sore throat and hoarseness or voice change.  Neck: Stiffness or pain in the neck.  Cardiorespiratory: Chest pain, edema, cough, hemoptysis, wheeze and shortness of breath.  Gastrointestinal: Abdominal pain, nausea, vomiting, constipation, diarrhea, black tarry stools, blood in the stools, change in the bowel habits, heartburn and indigestion.  Genitourinary: Urgency, frequency, dysuria, hematuria and nocturia, no incontinence.  Neurologic: Headache, weakness, loss of sensation, visual disturbance, trouble with balance or coordination and loss of memory.  Musculoskeletal: Negative.  Psychiatric: Symptoms of depression, feeling sad or blue, no anxiety.  Breasts: No masses, no dimpling or puckering, no rashes, no nipple discharge.    PHYSICAL EXAMINATION:  Weight is stable  Visit Vitals  /76 (BP Location: RUE - Right upper extremity, Patient Position: Sitting, Cuff Size: Large Adult)   Pulse 84   Ht 5' 5\" (1.651 m)   Wt 100.1 kg   LMP 01/06/2014   BMI 36.73 kg/m²      Constitutional:  Well-developed, well-nourished, no acute distress, non-toxic appearance.   Eyes:  PERRL (Pupils equal, round, reactive to light), conjunctivae clear, anicteric.  Nasopharynx: Nostrils patent. Membranes non-boggy, no discharge.  Ears: Auditory canals clear bilaterally, no cerumen or debris. No foreign bodies. Tympanic membranes: No effusions, no  Satisfactory perforations.  Oropharynx: Membranes pink, moist. Dentition is good. Posterior pharynx  - no erythema, no exudate.  Neck: Normal range of motion. No tenderness. Supple. No carotid bruits. No thyromegaly, no masses palpated.  Respiratory:  No respiratory distress, normal breath sounds, no rales, no wheezing.  Cardiovascular:  Normal rate, normal rhythm, no murmurs, no gallops, no rubs.   Gastrointestinal:  Soft, non-distended,  nontender, no organomegaly, no mass, no rebound, no guarding . Normoactive bowel sounds noted throughout.  Genitourinary:  No costovertebral angle tenderness.   Musculoskeletal:  No edema, no tenderness, no deformities. Back- no tenderness.  Skin:  Well-hydrated, no rash, no unusual moles.  Lymphatic:  No lymphadenopathy noted.   Neurologic:  Alert and oriented times 3, CN (Cranial nerves) 2-12 normal, normal motor function, normal sensory function, no focal deficits noted.   Psychiatric:  Speech and behavior appropriate. Groomed neatly, dress is appropriate.  Genitalia: Normal  female external genitalia. No rashes or lesions. No hernias.  Normal urinary meatus, no cystocele, no rectocele. Vaginal mucosa pink.  Bimanual: No masses   Anus:  Deferred  Breasts: Symmetric. No masses, no dimpling or puckering. No nipple discharge. No axillary adenopathy.  Diabetic Foot Exam Documentation     Normal Bilateral Foot Exam:  Skin integrity is normal. Dorsalis pedis and posterior tibial pulses are present.  Pressure sensation using the Celeste-Swathi monofilament is present.          Patient Active Problem List   Component Date Value Ref Range Status   • Microalbumin, Urine 09/07/2021 6.28  mg/dL Final   • Creatinine, Urine 09/07/2021 275.00  mg/dL Final   • Microalbumin/ Creatinine Ratio 09/07/2021 22.8  <30.0 mg/g Final   • Cholesterol 09/07/2021 173  <=199 mg/dL Final    Desirable         <200  Borderline High   200 to 239  High              >=240   • Triglycerides 09/07/2021 199* <=149 mg/dL Final     Normal            <150  Borderline High   150 to 199  High              200 to 499  Very High         >=500   • HDL 09/07/2021 43* >=50 mg/dL Final    Low              <40  Borderline Low   40 to 49  Near Optimal     50 to 59  Optimal          >=60   • LDL 09/07/2021 90  <=129 mg/dL Final    OPTIMAL           <100  NEAR OPTIMAL      100 to 129  BORDERLINE HIGH   130 to 159  HIGH              160 to 189  VERY HIGH         >=190   • Non-HDL Cholesterol 09/07/2021 130  mg/dL Final    Therapeutic Target:  CHD and risk equivalents  <130  Multiple risk factors     <160  0 to 1 risk factor        <190   • Cholesterol/ HDL Ratio 09/07/2021 4.0  <=4.4 Final   • Sodium 09/07/2021 135  135 - 145 mmol/L Final   • Potassium 09/07/2021 4.3  3.4 - 5.1 mmol/L Final   • Chloride 09/07/2021 101  98 - 107 mmol/L Final   • Carbon Dioxide 09/07/2021 22  21 - 32 mmol/L Final   • Anion Gap 09/07/2021 16  10 - 20 mmol/L Final   • Glucose 09/07/2021 130* 65 - 99 mg/dL Final   • BUN 09/07/2021 14  6 - 20 mg/dL Final   • Creatinine 09/07/2021 0.58  0.51 - 0.95 mg/dL Final   • Glomerular Filtration Rate 09/07/2021 >90  >=60 Final    eGFR results = or >60 mL/min/1.73m2 = Normal kidney function. Estimated GFR calculated using the 2009 CKD-EPI creatinine equation.     • BUN/ Creatinine Ratio 09/07/2021 24  7 - 25 Final   • Calcium 09/07/2021 9.6  8.4 - 10.2 mg/dL Final   • Bilirubin, Total 09/07/2021 0.5  0.2 - 1.0 mg/dL Final   • GOT/AST 09/07/2021 21  <=37 Units/L Final   • GPT/ALT 09/07/2021 55  <64 Units/L Final   • Alkaline Phosphatase 09/07/2021 81  45 - 117 Units/L Final   • Albumin 09/07/2021 4.1  3.6 - 5.1 g/dL Final   • Protein, Total 09/07/2021 8.1  6.4 - 8.2 g/dL Final   • Globulin 09/07/2021 4.0  2.0 - 4.0 g/dL Final   • A/G Ratio 09/07/2021 1.0  1.0 - 2.4 Final   • Hemoglobin A1C 09/07/2021 6.5* 4.5 - 5.6 % Final      Diabetic Screening  Non Diabetic:             <5.7%  Increased Risk:           5.7-6.4%  Diagnostic For Diabetes:   >6.4%    Diabetic Control  A1C%       eAG mg/dL  6.0            126  6.5            140  7.0            154  7.5            169  8.0            183  8.5            197  9.0            212  9.5            226  10.0           240       I have reviewed pertinent labs and imaging.    ASSESSMENT:     1. Annual physical exam    2. Benign essential hypertension    3. Hyperlipidemia, mixed    4. Type 2 diabetes mellitus without complication, without long-term current use of insulin (CMS/[facility])    5. Obesity (BMI 30-39.9)     plan:  Lab results reviewed.  Follow-up in 6 months.  Follow through with 3rd COVID vaccine, influenza vaccine in the next several weeks.  Self-breast exams monthly.  2. Losartan 25 mg a day, amlodipine 10 mg daily.  Encouraged to check blood pressure on occasion, continue regular exercise, work on weight loss  3. She is aware of her lipid results.  Encouraged regular exercise.  Atorvastatin 20 mg daily.  Repeat lipids 6 months  4. She is aware of her hemoglobin A1 c results.  Metformin 500 mg daily, limit carbohydrates, regular exercise, moderate weight loss.  Repeat hemoglobin A1 c 6 months  5. Her goal is to lose 10 lb     No orders of the defined types were placed in this encounter.      Body mass index is 36.73 kg/m².    BMI ASSESSMENT/PLAN:  Patient is obese.    See patient education in AVS             DEPRESSION ASSESSMENT/PLAN:  Depression screening is negative no further plan needed.      No follow-ups on file.

## 2021-11-02 ENCOUNTER — ASOB RESULT (OUTPATIENT)
Age: 36
End: 2021-11-02

## 2021-11-02 ENCOUNTER — APPOINTMENT (OUTPATIENT)
Dept: ANTEPARTUM | Facility: CLINIC | Age: 36
End: 2021-11-02
Payer: COMMERCIAL

## 2021-11-02 PROCEDURE — 76801 OB US < 14 WKS SINGLE FETUS: CPT

## 2021-11-02 PROCEDURE — 76813 OB US NUCHAL MEAS 1 GEST: CPT | Mod: 59

## 2021-12-29 ENCOUNTER — APPOINTMENT (OUTPATIENT)
Dept: ANTEPARTUM | Facility: CLINIC | Age: 36
End: 2021-12-29
Payer: COMMERCIAL

## 2021-12-29 ENCOUNTER — ASOB RESULT (OUTPATIENT)
Age: 36
End: 2021-12-29

## 2021-12-29 PROCEDURE — 76811 OB US DETAILED SNGL FETUS: CPT

## 2022-01-01 NOTE — ASU DISCHARGE PLAN (ADULT/PEDIATRIC). - BATHING
shower only Benefits, risks, and possible complications of procedure explained to patient/caregiver who verbalized understanding and gave written consent.

## 2022-02-12 ENCOUNTER — OUTPATIENT (OUTPATIENT)
Dept: INPATIENT UNIT | Facility: HOSPITAL | Age: 37
LOS: 1 days | Discharge: HOME | End: 2022-02-12
Payer: COMMERCIAL

## 2022-02-12 ENCOUNTER — EMERGENCY (EMERGENCY)
Facility: HOSPITAL | Age: 37
LOS: 1 days | Discharge: STILL ADMITTED OR ADMINISTRATIVE | End: 2022-02-12
Admitting: EMERGENCY MEDICINE
Payer: COMMERCIAL

## 2022-02-12 VITALS
SYSTOLIC BLOOD PRESSURE: 121 MMHG | HEART RATE: 87 BPM | DIASTOLIC BLOOD PRESSURE: 64 MMHG | RESPIRATION RATE: 16 BRPM | TEMPERATURE: 99 F

## 2022-02-12 VITALS
HEIGHT: 65 IN | RESPIRATION RATE: 16 BRPM | OXYGEN SATURATION: 100 % | SYSTOLIC BLOOD PRESSURE: 110 MMHG | TEMPERATURE: 98 F | HEART RATE: 89 BPM | DIASTOLIC BLOOD PRESSURE: 64 MMHG

## 2022-02-12 VITALS — SYSTOLIC BLOOD PRESSURE: 96 MMHG | HEART RATE: 79 BPM | DIASTOLIC BLOOD PRESSURE: 56 MMHG

## 2022-02-12 DIAGNOSIS — Z3A.00 WEEKS OF GESTATION OF PREGNANCY NOT SPECIFIED: ICD-10-CM

## 2022-02-12 DIAGNOSIS — Z98.82 BREAST IMPLANT STATUS: Chronic | ICD-10-CM

## 2022-02-12 DIAGNOSIS — E89.0 POSTPROCEDURAL HYPOTHYROIDISM: Chronic | ICD-10-CM

## 2022-02-12 DIAGNOSIS — O26.899 OTHER SPECIFIED PREGNANCY RELATED CONDITIONS, UNSPECIFIED TRIMESTER: ICD-10-CM

## 2022-02-12 PROCEDURE — 59025 FETAL NON-STRESS TEST: CPT | Mod: 26

## 2022-02-12 PROCEDURE — 99202 OFFICE O/P NEW SF 15 MIN: CPT | Mod: 25

## 2022-02-12 PROCEDURE — 76817 TRANSVAGINAL US OBSTETRIC: CPT | Mod: 26

## 2022-02-12 PROCEDURE — 76815 OB US LIMITED FETUS(S): CPT | Mod: 26

## 2022-02-12 PROCEDURE — L9993: CPT

## 2022-02-12 RX ORDER — IBUPROFEN 200 MG
1 TABLET ORAL
Qty: 0 | Refills: 0 | DISCHARGE

## 2022-02-12 RX ORDER — LEVOTHYROXINE SODIUM 125 MCG
1 TABLET ORAL
Qty: 0 | Refills: 0 | DISCHARGE

## 2022-02-12 NOTE — ED ADULT TRIAGE NOTE - MEANS OF ARRIVAL
This is been a chronic issue dating back to about age 16, untreated the last several years.  We did obtain thyroid uptake scan as noted below.  She just restarted methimazole today.  We will plan for recheck of her labs.  Component                                    Latest Ref Rng & Units                   5/7/2021 5/7/2021 5/7/2021                                                                                                      7:54 AM               7:54 AM              7:54 AM               TSH                                          0.380 - 5.330 uIU/mL                     <0.005 (L)                                                        Free T-4                                     0.93 - 1.70 ng/dL                                              2.37 (H)                                    T3                                           60.0 - 181.0 ng/dL                                                                  234.0 (H)                6/2/2021 10:06 AM     HISTORY/REASON FOR EXAM:  Hyperthyroidism, no nodule.        TECHNIQUE/EXAM DESCRIPTION AND NUMBER OF VIEWS:  Thyroid uptake and scan, nuclear medicine.     COMPARISON: None     PROCEDURE:     0.472 microcuries [id number] were administered as two capsules.  The patient returned five hours later for uptake measurements and scanning.     FINDINGS:  The planar scan demonstrates homogeneous labeling throughout the thyroid gland.     The five-hour uptake measurement equals 70%.     IMPRESSION:     Increased uptake is consistent with hyperthyroidism     ambulatory

## 2022-02-12 NOTE — ED ADULT TRIAGE NOTE - CHIEF COMPLAINT QUOTE
pt is 27 wks pregnant, CALIXTO 22, . started last night with spotting and then now vaginal bleeding. Pt of Dr. Quick's office. Hx thyroidectomy on Synthroid

## 2022-02-12 NOTE — OB PROVIDER TRIAGE NOTE - HISTORY OF PRESENT ILLNESS
37yo  @ 27.0 presents with c/o vaginal bleeding. Reports it started at 2300 last night as blood tinged mucus, today she noticed dark red blood.   Denies pain and reports GFM. Denies recent intercourse.   Denies H/o COVID-19    H/O  Thyroidectomy for thyroid cancer with radiation        Synthroid 175mcg M-F                       150mcg sat and sun  Breast Augmentation

## 2022-02-12 NOTE — OB PROVIDER TRIAGE NOTE - NSHPPHYSICALEXAM_GEN_ALL_CORE
Assessment reveals VSS, abdomen soft, Nt, gravid.   BPP 8/8, transverse lie, fundal placenta, EFW 1006g, LENARD 11.3  SSE- cervix appears closed. Small amount of dark red tinged mucus noted in vaginal vault.   CL 4.1 no funneling or dynamic changes  Blood type A pos Assessment reveals VSS, abdomen soft, Nt, gravid.   BPP 8/8, transverse lie, fundal placenta, EFW 1006g, LENARD 11.3  SSE- cervix appears closed. Small amount of dark red tinged mucus noted in vaginal vault.   CL 4.1 no funneling or dynamic changes  Blood type A pos    Cat 1 FHT, ctx x 1 on toco

## 2022-02-12 NOTE — OB PROVIDER TRIAGE NOTE - NSOBPROVIDERNOTE_OBGYN_ALL_OB_FT
Plan D/W Dr. Davis, no evidence of acute process at this time, normal fetal testing.  Follow up as scheduled  Call MD for abdominal pain or increase in vaginal bleeding.

## 2022-04-25 ENCOUNTER — OUTPATIENT (OUTPATIENT)
Dept: OUTPATIENT SERVICES | Facility: HOSPITAL | Age: 37
LOS: 1 days | End: 2022-04-25

## 2022-04-25 VITALS
OXYGEN SATURATION: 98 % | DIASTOLIC BLOOD PRESSURE: 70 MMHG | RESPIRATION RATE: 16 BRPM | SYSTOLIC BLOOD PRESSURE: 124 MMHG | HEART RATE: 88 BPM | TEMPERATURE: 98 F

## 2022-04-25 DIAGNOSIS — E03.9 HYPOTHYROIDISM, UNSPECIFIED: ICD-10-CM

## 2022-04-25 DIAGNOSIS — Z98.82 BREAST IMPLANT STATUS: Chronic | ICD-10-CM

## 2022-04-25 DIAGNOSIS — Z98.891 HISTORY OF UTERINE SCAR FROM PREVIOUS SURGERY: ICD-10-CM

## 2022-04-25 DIAGNOSIS — E89.0 POSTPROCEDURAL HYPOTHYROIDISM: Chronic | ICD-10-CM

## 2022-04-25 DIAGNOSIS — Z98.891 HISTORY OF UTERINE SCAR FROM PREVIOUS SURGERY: Chronic | ICD-10-CM

## 2022-04-25 LAB
APPEARANCE UR: CLEAR — SIGNIFICANT CHANGE UP
BACTERIA # UR AUTO: NEGATIVE — SIGNIFICANT CHANGE UP
BILIRUB UR-MCNC: NEGATIVE — SIGNIFICANT CHANGE UP
BLD GP AB SCN SERPL QL: NEGATIVE — SIGNIFICANT CHANGE UP
COLOR SPEC: YELLOW — SIGNIFICANT CHANGE UP
DIFF PNL FLD: NEGATIVE — SIGNIFICANT CHANGE UP
EPI CELLS # UR: 1 /HPF — SIGNIFICANT CHANGE UP (ref 0–5)
GLUCOSE UR QL: ABNORMAL
HCT VFR BLD CALC: 33.7 % — LOW (ref 34.5–45)
HGB BLD-MCNC: 11.5 G/DL — SIGNIFICANT CHANGE UP (ref 11.5–15.5)
HYALINE CASTS # UR AUTO: 3 /LPF — SIGNIFICANT CHANGE UP (ref 0–7)
KETONES UR-MCNC: NEGATIVE — SIGNIFICANT CHANGE UP
LEUKOCYTE ESTERASE UR-ACNC: NEGATIVE — SIGNIFICANT CHANGE UP
MCHC RBC-ENTMCNC: 31 PG — SIGNIFICANT CHANGE UP (ref 27–34)
MCHC RBC-ENTMCNC: 34.1 GM/DL — SIGNIFICANT CHANGE UP (ref 32–36)
MCV RBC AUTO: 90.8 FL — SIGNIFICANT CHANGE UP (ref 80–100)
NITRITE UR-MCNC: NEGATIVE — SIGNIFICANT CHANGE UP
NRBC # BLD: 0 /100 WBCS — SIGNIFICANT CHANGE UP
NRBC # FLD: 0 K/UL — SIGNIFICANT CHANGE UP
PH UR: 6.5 — SIGNIFICANT CHANGE UP (ref 5–8)
PLATELET # BLD AUTO: 194 K/UL — SIGNIFICANT CHANGE UP (ref 150–400)
PROT UR-MCNC: ABNORMAL
RBC # BLD: 3.71 M/UL — LOW (ref 3.8–5.2)
RBC # FLD: 12.8 % — SIGNIFICANT CHANGE UP (ref 10.3–14.5)
RBC CASTS # UR COMP ASSIST: 0 /HPF — SIGNIFICANT CHANGE UP (ref 0–4)
RH IG SCN BLD-IMP: POSITIVE — SIGNIFICANT CHANGE UP
SP GR SPEC: 1.03 — SIGNIFICANT CHANGE UP (ref 1–1.05)
UROBILINOGEN FLD QL: SIGNIFICANT CHANGE UP
WBC # BLD: 6.61 K/UL — SIGNIFICANT CHANGE UP (ref 3.8–10.5)
WBC # FLD AUTO: 6.61 K/UL — SIGNIFICANT CHANGE UP (ref 3.8–10.5)
WBC UR QL: 1 /HPF — SIGNIFICANT CHANGE UP (ref 0–5)

## 2022-04-25 RX ORDER — LEVOTHYROXINE SODIUM 125 MCG
1 TABLET ORAL
Qty: 0 | Refills: 0 | DISCHARGE

## 2022-04-25 RX ORDER — SODIUM CHLORIDE 9 MG/ML
1000 INJECTION, SOLUTION INTRAVENOUS
Refills: 0 | Status: DISCONTINUED | OUTPATIENT
Start: 2022-05-08 | End: 2022-05-10

## 2022-04-25 RX ORDER — LEVOTHYROXINE SODIUM 125 MCG
0 TABLET ORAL
Qty: 0 | Refills: 0 | DISCHARGE

## 2022-04-25 NOTE — OB PST NOTE - PROBLEM SELECTOR PLAN 1
Pt scheduled for surgery Repeat  with Dr Carranza tentatively 22 and preop instructions including instructions for  Chlorhexidine use in showering on the day of surgery, given verbally and with use of  written materials, and patient confirming understanding of such instructions using  teach back method.

## 2022-04-25 NOTE — OB PST NOTE - NSHPPHYSICALEXAM_GEN_ALL_CORE
Constitutional: Well Developed, Well Groomed, Well Nourished, No Distress    Eyes: PERRL, EOMI, conjunctiva clear    Ears: Normal    Mouth & Gums: Normal, moist    Pharynx: No tenderness, discharge, or peritonsillar abscess    Tonsils: No Redness, discharge, tenderness, or swelling    Neck: Supple, no JVD, normal thyroid glands, no carotid bruits, no cervical vertebral or paraspinal tenderness    Breast: Normal shape, no masses, no tenderness, nipples normal, no nipple discharge    Back: Normal shape, ROM intact, strength intact, no vertebral tenderness    Respiratory: Airway patent, breath sounds equal, good air movement, respiration non-labored, clear to auscultation bilateral, no chest wall tenderness, no intercostal retractions, no rales, no wheezes, no rhonchi, no subcutaneous emphysema    Cardiovascular:  Regular rate and rhythm, no rubs or murmur, normal PMI    Gastrointestinal: Soft, non-tender, non distention, no masses palpable, bowel sound normal, no bruit, no rebound tenderness    Extremities: No clubbing, cyanosis, or pedal edema    Vascular:  Carotid Pulse normal , Radial Pulse normal, Femoral Pulse normal, DP pulse normal, PT pulse normal    Neurological: alert & oriented x 3, sensation intact, deep reflexes intact, cranial nerve intact, normal strength    Skin: warm and dry, normal color    Lymph Nodes: normal posterior cervical lymph node, normal anterior cervical lymph node, normal supraclavicular lymph node, normal axillary lymph node, normal inguinal lymph node, normal femoral lymph node    Musculoskeletal: ROM intact, no joint swelling, warmth, or calf tenderness. Normal strength    Psychiatric: normal affect, normal behavior Constitutional: Well Developed, Well Groomed, Well Nourished, No Distress    Eyes: PERRL, EOMI, conjunctiva clear    Ears: Normal    Mouth & Gums: Normal, moist    Neck: Supple, no JVD,   Breast: Normal shape, no masses, no tenderness, nipples normal, no nipple discharge    Back: Normal shape, ROM intact, strength intact,   Respiratory: Airway patent, breath sounds equal, good air movement, respiration non-labored, clear to auscultation bilateral,     Cardiovascular:  Regular rate and rhythm,   Gastrointestinal:    Extremities: No clubbing, cyanosis, or pedal edema    Vascular:  Radial Pulse normal,   Neurological: alert & oriented x 3,   Skin: warm and dry, normal color    Lymph Nodes: normal posterior cervical lymph node, normal anterior cervical lymph node, normal supraclavicular lymph node,     Musculoskeletal: ROM intact, . Normal strength    Psychiatric: normal affect, normal behavior

## 2022-04-25 NOTE — OB PST NOTE - NSHPREVIEWOFSYSTEMS_GEN_ALL_CORE
General: No fever, chills, sweating, anorexia, weight loss or weight gain. No polyphagia, polyurea, polydypsia, malaise, or fatigue    Skin: No rashes, itching, or dryness. No change in size/color of moles. No tumors, brittle nails, pitted nails, or hair loss    Breast: No tenderness, lumps, or nipple discharge      Ophthalmologic: No diplopia, photophobia, lacrimation, blurred Vision , or eye discharge    ENMT Symptoms: No hearing difficulty, ear pain, tinnitus, or vertigo. No sinus symptoms, nasal congestion, nasal   discharge, or nasal obstruction    Respiratory and Thorax: No wheezing, dyspnea, cough, hemoptysis, or pleuritic chest pPain     Cardiovascular: No chest pain, palpitations, dyspnea on exertion, orthopnea, paroxysmal nocturnal dyspnea,   peripheral edema, or claudication    Gastrointestinal: No nausea, vomiting, diarrhea, constipation, change in bowel habits, flatulence, abdominal pain, or melena    Genitourinary/ Pelvis: No hematuria, renal colic, or flank pain.  No urine discoloration, incontinence, dysuria, or urinary hesitancy. Normal urinary frequency. No nocturia, abnormal vaginal bleeding, vaginal discharge, spotting, pelvic pain, or vaginal leakage    Musculoskeletal: No arthralgia, arthritis, joint swelling, muscle cramping, muscle weakness, neck pain, arm pain, or leg pain    Neurological: No transient paralysis, weakness, paresthesias, or seizures. No syncope, tremors, vertigo, loss of sensation, difficulty walking, loss of consciousness, hemiparesis, confusion, or facial palsy    Psychiatric: No suicidal ideation, depression, anxiety, insomnia, memory loss, paranoia, mood swings, agitation, hallucinations, or hyperactivity    Hematology: No gum bleeding, nose bleeding, or skin lumps    Lymphatic: No enlarged or tender lymph nodes. No extremity swelling    Endocrine: No heat or cold intolerance    Immunologic: No recurrent or persistent infections General: No fever, chills,   Skin: No rashes,   Breast: Normal   Ophthalmologic: No diplopia   ENMT Symptoms: No hearing difficulty, ear pain, tinnitus, or vertigo. No sinus symptoms, nasal congestion, nasal   discharge, or nasal obstruction    Respiratory and Thorax: No wheezing, dyspnea,   Cardiovascular: No chest pain, palpitations, dyspnea on exertion,     Gastrointestinal: No nausea, vomiting,   Genitourinary/ Pelvis: No hematuria, or vaginal bleeding   Musculoskeletal: No arthralgia, arthritis,   Neurological: No transient paralysis, weakness, paresthesias, or seizures.    Psychiatric: No suicidal ideation, depression, anxiety,   Hematology: No gum bleeding, nose bleeding, or skin lumps    Lymphatic: No enlarged or tender lymph nodes.   Endocrine: Hx thyroidectomy for goiter / papillary ca - pt now stable on current dose of Synthroid - TFTs done recently   Immunologic: No recurrent or persistent infections

## 2022-04-25 NOTE — OB PST NOTE - NSICDXPASTSURGICALHX_GEN_ALL_CORE_FT
PAST SURGICAL HISTORY:  H/O breast augmentation     H/O thyroidectomy      PAST SURGICAL HISTORY:  H/O breast augmentation     H/O thyroidectomy     H/O:

## 2022-04-25 NOTE — OB PST NOTE - NSICDXPASTMEDICALHX_GEN_ALL_CORE_FT
PAST MEDICAL HISTORY:  Acquired hypothyroidism     Papillary thyroid carcinoma     Thyroid goiter      PAST MEDICAL HISTORY:  Acquired hypothyroidism     H/O:      Papillary thyroid carcinoma     Thyroid goiter

## 2022-04-25 NOTE — OB PST NOTE - HISTORY OF PRESENT ILLNESS
Pt is a 36 yr old female scheduled for Repeat  with Dr Carranza tentatively 22 - pt hx  2018 at 41 weeks for narrow pelvis and fetal distress - pt now denies vaginal bleeding or contractions and last OB exam 1 week ago with fetal heartbeat evaluated - pt confirms fetal movement today. Hx thyroid ca - total thyroidectomy  - stable on current dosing of Synthroid. Pt denies GDM, HTN

## 2022-04-25 NOTE — OB PST NOTE - NSANTHOSAYNRD_GEN_A_CORE
"Occupational Therapy Treatment completed with focus on ADLs, ADL transfers and patient education.  Functional Status:  Pt seen for OT tx. Mod A supine to sit via log roll. Pt pleasantly confused during session and required cueing to complete bed mobility w/ log rolling. Attempted to have pt complete oral care, pt biting green sponge w/ cueing to open mouth. Pt able to demonstrate ROM to completed oral care w/ R UE. Pt attempting to stand w/ mod A x2 multiple times throughout tx. Pt required cueing for safety awareness and telling pt to wait for therapist prior to attempting to stand.  Plan of Care: Will benefit from Occupational Therapy 3 times per week  Discharge Recommendations:  Equipment Will Continue to Assess for Equipment Needs. Post-acute therapy Discharge to a transitional care facility for continued skilled therapy services.    See \"Rehab Therapy-Acute\" Patient Summary Report for complete documentation.   " No. SUE screening performed.  STOP BANG Legend: 0-2 = LOW Risk; 3-4 = INTERMEDIATE Risk; 5-8 = HIGH Risk

## 2022-05-07 ENCOUNTER — TRANSCRIPTION ENCOUNTER (OUTPATIENT)
Age: 37
End: 2022-05-07

## 2022-05-08 ENCOUNTER — TRANSCRIPTION ENCOUNTER (OUTPATIENT)
Age: 37
End: 2022-05-08

## 2022-05-08 ENCOUNTER — INPATIENT (INPATIENT)
Facility: HOSPITAL | Age: 37
LOS: 1 days | Discharge: HOME | End: 2022-05-10
Attending: STUDENT IN AN ORGANIZED HEALTH CARE EDUCATION/TRAINING PROGRAM | Admitting: STUDENT IN AN ORGANIZED HEALTH CARE EDUCATION/TRAINING PROGRAM

## 2022-05-08 VITALS — DIASTOLIC BLOOD PRESSURE: 72 MMHG | SYSTOLIC BLOOD PRESSURE: 126 MMHG | HEART RATE: 94 BPM

## 2022-05-08 DIAGNOSIS — Z98.891 HISTORY OF UTERINE SCAR FROM PREVIOUS SURGERY: Chronic | ICD-10-CM

## 2022-05-08 DIAGNOSIS — Z98.82 BREAST IMPLANT STATUS: Chronic | ICD-10-CM

## 2022-05-08 DIAGNOSIS — E89.0 POSTPROCEDURAL HYPOTHYROIDISM: Chronic | ICD-10-CM

## 2022-05-08 LAB
ALBUMIN SERPL ELPH-MCNC: 2.9 G/DL — LOW (ref 3.3–5)
ALP SERPL-CCNC: 135 U/L — HIGH (ref 40–120)
ALT FLD-CCNC: 10 U/L — SIGNIFICANT CHANGE UP (ref 4–33)
ANION GAP SERPL CALC-SCNC: 8 MMOL/L — SIGNIFICANT CHANGE UP (ref 7–14)
APTT BLD: 25.4 SEC — LOW (ref 27–36.3)
APTT BLD: 26.1 SEC — LOW (ref 27–36.3)
AST SERPL-CCNC: 20 U/L — SIGNIFICANT CHANGE UP (ref 4–32)
BASOPHILS # BLD AUTO: 0 K/UL — SIGNIFICANT CHANGE UP (ref 0–0.2)
BASOPHILS # BLD AUTO: 0.03 K/UL — SIGNIFICANT CHANGE UP (ref 0–0.2)
BASOPHILS # BLD AUTO: 0.03 K/UL — SIGNIFICANT CHANGE UP (ref 0–0.2)
BASOPHILS NFR BLD AUTO: 0 % — SIGNIFICANT CHANGE UP (ref 0–2)
BASOPHILS NFR BLD AUTO: 0.3 % — SIGNIFICANT CHANGE UP (ref 0–2)
BASOPHILS NFR BLD AUTO: 0.4 % — SIGNIFICANT CHANGE UP (ref 0–2)
BILIRUB SERPL-MCNC: 0.8 MG/DL — SIGNIFICANT CHANGE UP (ref 0.2–1.2)
BLD GP AB SCN SERPL QL: NEGATIVE — SIGNIFICANT CHANGE UP
BUN SERPL-MCNC: 8 MG/DL — SIGNIFICANT CHANGE UP (ref 7–23)
CALCIUM SERPL-MCNC: 8.3 MG/DL — LOW (ref 8.4–10.5)
CHLORIDE SERPL-SCNC: 109 MMOL/L — HIGH (ref 98–107)
CO2 SERPL-SCNC: 19 MMOL/L — LOW (ref 22–31)
COVID-19 SPIKE DOMAIN AB INTERP: POSITIVE
COVID-19 SPIKE DOMAIN ANTIBODY RESULT: >250 U/ML — HIGH
CREAT SERPL-MCNC: 0.45 MG/DL — LOW (ref 0.5–1.3)
EGFR: 128 ML/MIN/1.73M2 — SIGNIFICANT CHANGE UP
EOSINOPHIL # BLD AUTO: 0 K/UL — SIGNIFICANT CHANGE UP (ref 0–0.5)
EOSINOPHIL # BLD AUTO: 0.18 K/UL — SIGNIFICANT CHANGE UP (ref 0–0.5)
EOSINOPHIL # BLD AUTO: 0.18 K/UL — SIGNIFICANT CHANGE UP (ref 0–0.5)
EOSINOPHIL NFR BLD AUTO: 0 % — SIGNIFICANT CHANGE UP (ref 0–6)
EOSINOPHIL NFR BLD AUTO: 1.6 % — SIGNIFICANT CHANGE UP (ref 0–6)
EOSINOPHIL NFR BLD AUTO: 2.5 % — SIGNIFICANT CHANGE UP (ref 0–6)
FIBRINOGEN PPP-MCNC: 546 MG/DL — HIGH (ref 330–520)
GLUCOSE SERPL-MCNC: 101 MG/DL — HIGH (ref 70–99)
HCT VFR BLD CALC: 29.8 % — LOW (ref 34.5–45)
HCT VFR BLD CALC: 33.6 % — LOW (ref 34.5–45)
HCT VFR BLD CALC: 33.8 % — LOW (ref 34.5–45)
HGB BLD-MCNC: 10.1 G/DL — LOW (ref 11.5–15.5)
HGB BLD-MCNC: 11.6 G/DL — SIGNIFICANT CHANGE UP (ref 11.5–15.5)
HGB BLD-MCNC: 11.8 G/DL — SIGNIFICANT CHANGE UP (ref 11.5–15.5)
IANC: 16.17 K/UL — HIGH (ref 1.8–7.4)
IANC: 5.22 K/UL — SIGNIFICANT CHANGE UP (ref 1.8–7.4)
IANC: 8.36 K/UL — HIGH (ref 1.8–7.4)
IMM GRANULOCYTES NFR BLD AUTO: 0.4 % — SIGNIFICANT CHANGE UP (ref 0–1.5)
IMM GRANULOCYTES NFR BLD AUTO: 0.4 % — SIGNIFICANT CHANGE UP (ref 0–1.5)
INR BLD: 0.94 RATIO — SIGNIFICANT CHANGE UP (ref 0.88–1.16)
INR BLD: 0.97 RATIO — SIGNIFICANT CHANGE UP (ref 0.88–1.16)
LYMPHOCYTES # BLD AUTO: 0.7 K/UL — LOW (ref 1–3.3)
LYMPHOCYTES # BLD AUTO: 1.33 K/UL — SIGNIFICANT CHANGE UP (ref 1–3.3)
LYMPHOCYTES # BLD AUTO: 1.74 K/UL — SIGNIFICANT CHANGE UP (ref 1–3.3)
LYMPHOCYTES # BLD AUTO: 15.9 % — SIGNIFICANT CHANGE UP (ref 13–44)
LYMPHOCYTES # BLD AUTO: 18.5 % — SIGNIFICANT CHANGE UP (ref 13–44)
LYMPHOCYTES # BLD AUTO: 4 % — LOW (ref 13–44)
MCHC RBC-ENTMCNC: 31 PG — SIGNIFICANT CHANGE UP (ref 27–34)
MCHC RBC-ENTMCNC: 31.1 PG — SIGNIFICANT CHANGE UP (ref 27–34)
MCHC RBC-ENTMCNC: 31.3 PG — SIGNIFICANT CHANGE UP (ref 27–34)
MCHC RBC-ENTMCNC: 33.9 GM/DL — SIGNIFICANT CHANGE UP (ref 32–36)
MCHC RBC-ENTMCNC: 34.5 GM/DL — SIGNIFICANT CHANGE UP (ref 32–36)
MCHC RBC-ENTMCNC: 34.9 GM/DL — SIGNIFICANT CHANGE UP (ref 32–36)
MCV RBC AUTO: 89.7 FL — SIGNIFICANT CHANGE UP (ref 80–100)
MCV RBC AUTO: 89.8 FL — SIGNIFICANT CHANGE UP (ref 80–100)
MCV RBC AUTO: 91.7 FL — SIGNIFICANT CHANGE UP (ref 80–100)
MONOCYTES # BLD AUTO: 0 K/UL — SIGNIFICANT CHANGE UP (ref 0–0.9)
MONOCYTES # BLD AUTO: 0.41 K/UL — SIGNIFICANT CHANGE UP (ref 0–0.9)
MONOCYTES # BLD AUTO: 0.56 K/UL — SIGNIFICANT CHANGE UP (ref 0–0.9)
MONOCYTES NFR BLD AUTO: 0 % — LOW (ref 2–14)
MONOCYTES NFR BLD AUTO: 5.1 % — SIGNIFICANT CHANGE UP (ref 2–14)
MONOCYTES NFR BLD AUTO: 5.7 % — SIGNIFICANT CHANGE UP (ref 2–14)
NEUTROPHILS # BLD AUTO: 16.26 K/UL — HIGH (ref 1.8–7.4)
NEUTROPHILS # BLD AUTO: 5.22 K/UL — SIGNIFICANT CHANGE UP (ref 1.8–7.4)
NEUTROPHILS # BLD AUTO: 8.36 K/UL — HIGH (ref 1.8–7.4)
NEUTROPHILS NFR BLD AUTO: 72.5 % — SIGNIFICANT CHANGE UP (ref 43–77)
NEUTROPHILS NFR BLD AUTO: 76.7 % — SIGNIFICANT CHANGE UP (ref 43–77)
NEUTROPHILS NFR BLD AUTO: 86 % — HIGH (ref 43–77)
NRBC # BLD: 0 /100 WBCS — SIGNIFICANT CHANGE UP
NRBC # BLD: 0 /100 WBCS — SIGNIFICANT CHANGE UP
NRBC # FLD: 0 K/UL — SIGNIFICANT CHANGE UP
NRBC # FLD: 0 K/UL — SIGNIFICANT CHANGE UP
PLATELET # BLD AUTO: 175 K/UL — SIGNIFICANT CHANGE UP (ref 150–400)
PLATELET # BLD AUTO: 176 K/UL — SIGNIFICANT CHANGE UP (ref 150–400)
PLATELET # BLD AUTO: 184 K/UL — SIGNIFICANT CHANGE UP (ref 150–400)
POTASSIUM SERPL-MCNC: 4.2 MMOL/L — SIGNIFICANT CHANGE UP (ref 3.5–5.3)
POTASSIUM SERPL-SCNC: 4.2 MMOL/L — SIGNIFICANT CHANGE UP (ref 3.5–5.3)
PROT SERPL-MCNC: 5.4 G/DL — LOW (ref 6–8.3)
PROTHROM AB SERPL-ACNC: 10.9 SEC — SIGNIFICANT CHANGE UP (ref 10.5–13.4)
PROTHROM AB SERPL-ACNC: 11.3 SEC — SIGNIFICANT CHANGE UP (ref 10.5–13.4)
RBC # BLD: 3.25 M/UL — LOW (ref 3.8–5.2)
RBC # BLD: 3.74 M/UL — LOW (ref 3.8–5.2)
RBC # BLD: 3.77 M/UL — LOW (ref 3.8–5.2)
RBC # FLD: 12.5 % — SIGNIFICANT CHANGE UP (ref 10.3–14.5)
RBC # FLD: 12.5 % — SIGNIFICANT CHANGE UP (ref 10.3–14.5)
RBC # FLD: 12.6 % — SIGNIFICANT CHANGE UP (ref 10.3–14.5)
RH IG SCN BLD-IMP: POSITIVE — SIGNIFICANT CHANGE UP
SARS-COV-2 IGG+IGM SERPL QL IA: >250 U/ML — HIGH
SARS-COV-2 IGG+IGM SERPL QL IA: POSITIVE
SODIUM SERPL-SCNC: 136 MMOL/L — SIGNIFICANT CHANGE UP (ref 135–145)
WBC # BLD: 10.91 K/UL — HIGH (ref 3.8–10.5)
WBC # BLD: 17.48 K/UL — HIGH (ref 3.8–10.5)
WBC # BLD: 7.2 K/UL — SIGNIFICANT CHANGE UP (ref 3.8–10.5)
WBC # FLD AUTO: 10.91 K/UL — HIGH (ref 3.8–10.5)
WBC # FLD AUTO: 17.48 K/UL — HIGH (ref 3.8–10.5)
WBC # FLD AUTO: 7.2 K/UL — SIGNIFICANT CHANGE UP (ref 3.8–10.5)

## 2022-05-08 DEVICE — SURGICEL FIBRILLAR 1 X 2": Type: IMPLANTABLE DEVICE | Status: FUNCTIONAL

## 2022-05-08 DEVICE — SURGICEL SNOW 2 X 4": Type: IMPLANTABLE DEVICE | Status: FUNCTIONAL

## 2022-05-08 RX ORDER — SODIUM CHLORIDE 9 MG/ML
1000 INJECTION, SOLUTION INTRAVENOUS ONCE
Refills: 0 | Status: COMPLETED | OUTPATIENT
Start: 2022-05-08 | End: 2022-05-08

## 2022-05-08 RX ORDER — LEVOTHYROXINE SODIUM 125 MCG
175 TABLET ORAL DAILY
Refills: 0 | Status: DISCONTINUED | OUTPATIENT
Start: 2022-05-09 | End: 2022-05-10

## 2022-05-08 RX ORDER — LANOLIN
1 OINTMENT (GRAM) TOPICAL EVERY 6 HOURS
Refills: 0 | Status: DISCONTINUED | OUTPATIENT
Start: 2022-05-08 | End: 2022-05-10

## 2022-05-08 RX ORDER — SODIUM CHLORIDE 9 MG/ML
1000 INJECTION, SOLUTION INTRAVENOUS
Refills: 0 | Status: DISCONTINUED | OUTPATIENT
Start: 2022-05-08 | End: 2022-05-10

## 2022-05-08 RX ORDER — OXYCODONE HYDROCHLORIDE 5 MG/1
5 TABLET ORAL ONCE
Refills: 0 | Status: DISCONTINUED | OUTPATIENT
Start: 2022-05-08 | End: 2022-05-10

## 2022-05-08 RX ORDER — OXYCODONE HYDROCHLORIDE 5 MG/1
5 TABLET ORAL
Refills: 0 | Status: DISCONTINUED | OUTPATIENT
Start: 2022-05-08 | End: 2022-05-10

## 2022-05-08 RX ORDER — FAMOTIDINE 10 MG/ML
20 INJECTION INTRAVENOUS ONCE
Refills: 0 | Status: COMPLETED | OUTPATIENT
Start: 2022-05-08 | End: 2022-05-08

## 2022-05-08 RX ORDER — IBUPROFEN 200 MG
600 TABLET ORAL EVERY 6 HOURS
Refills: 0 | Status: COMPLETED | OUTPATIENT
Start: 2022-05-08 | End: 2023-04-06

## 2022-05-08 RX ORDER — ACETAMINOPHEN 500 MG
975 TABLET ORAL
Refills: 0 | Status: DISCONTINUED | OUTPATIENT
Start: 2022-05-08 | End: 2022-05-10

## 2022-05-08 RX ORDER — ACETAMINOPHEN 500 MG
1000 TABLET ORAL ONCE
Refills: 0 | Status: COMPLETED | OUTPATIENT
Start: 2022-05-08 | End: 2022-05-08

## 2022-05-08 RX ORDER — CITRIC ACID/SODIUM CITRATE 300-500 MG
30 SOLUTION, ORAL ORAL ONCE
Refills: 0 | Status: COMPLETED | OUTPATIENT
Start: 2022-05-08 | End: 2022-05-08

## 2022-05-08 RX ORDER — KETOROLAC TROMETHAMINE 30 MG/ML
30 SYRINGE (ML) INJECTION EVERY 6 HOURS
Refills: 0 | Status: DISCONTINUED | OUTPATIENT
Start: 2022-05-08 | End: 2022-05-09

## 2022-05-08 RX ORDER — HEPARIN SODIUM 5000 [USP'U]/ML
5000 INJECTION INTRAVENOUS; SUBCUTANEOUS EVERY 12 HOURS
Refills: 0 | Status: DISCONTINUED | OUTPATIENT
Start: 2022-05-08 | End: 2022-05-10

## 2022-05-08 RX ORDER — OXYTOCIN 10 UNIT/ML
333.33 VIAL (ML) INJECTION
Qty: 20 | Refills: 0 | Status: DISCONTINUED | OUTPATIENT
Start: 2022-05-08 | End: 2022-05-10

## 2022-05-08 RX ORDER — TETANUS TOXOID, REDUCED DIPHTHERIA TOXOID AND ACELLULAR PERTUSSIS VACCINE, ADSORBED 5; 2.5; 8; 8; 2.5 [IU]/.5ML; [IU]/.5ML; UG/.5ML; UG/.5ML; UG/.5ML
0.5 SUSPENSION INTRAMUSCULAR ONCE
Refills: 0 | Status: DISCONTINUED | OUTPATIENT
Start: 2022-05-08 | End: 2022-05-10

## 2022-05-08 RX ORDER — SIMETHICONE 80 MG/1
80 TABLET, CHEWABLE ORAL EVERY 4 HOURS
Refills: 0 | Status: DISCONTINUED | OUTPATIENT
Start: 2022-05-08 | End: 2022-05-10

## 2022-05-08 RX ORDER — MAGNESIUM HYDROXIDE 400 MG/1
30 TABLET, CHEWABLE ORAL
Refills: 0 | Status: DISCONTINUED | OUTPATIENT
Start: 2022-05-08 | End: 2022-05-10

## 2022-05-08 RX ORDER — DIPHENHYDRAMINE HCL 50 MG
25 CAPSULE ORAL EVERY 6 HOURS
Refills: 0 | Status: DISCONTINUED | OUTPATIENT
Start: 2022-05-08 | End: 2022-05-10

## 2022-05-08 RX ADMIN — FAMOTIDINE 20 MILLIGRAM(S): 10 INJECTION INTRAVENOUS at 10:15

## 2022-05-08 RX ADMIN — Medication 1000 MILLIGRAM(S): at 15:26

## 2022-05-08 RX ADMIN — Medication 30 MILLIGRAM(S): at 22:10

## 2022-05-08 RX ADMIN — SODIUM CHLORIDE 75 MILLILITER(S): 9 INJECTION, SOLUTION INTRAVENOUS at 14:56

## 2022-05-08 RX ADMIN — Medication 1000 MILLIUNIT(S)/MIN: at 14:56

## 2022-05-08 RX ADMIN — Medication 400 MILLIGRAM(S): at 14:56

## 2022-05-08 RX ADMIN — SODIUM CHLORIDE 125 MILLILITER(S): 9 INJECTION, SOLUTION INTRAVENOUS at 08:29

## 2022-05-08 RX ADMIN — SODIUM CHLORIDE 2000 MILLILITER(S): 9 INJECTION, SOLUTION INTRAVENOUS at 07:30

## 2022-05-08 RX ADMIN — Medication 30 MILLILITER(S): at 10:15

## 2022-05-08 RX ADMIN — HEPARIN SODIUM 5000 UNIT(S): 5000 INJECTION INTRAVENOUS; SUBCUTANEOUS at 20:00

## 2022-05-08 RX ADMIN — Medication 30 MILLIGRAM(S): at 22:48

## 2022-05-08 NOTE — OB PROVIDER H&P - ASSESSMENT
37yo  @39w1d presents for repeat C/S  - admit to L&D  - routine labs  - COVID  - IVF  - EFM, [place]  - plan for repeat C/S    D/W Dr. Maddox  Note written by MEERA Parra MS3  Addended by LV Bustillos, PGY-2

## 2022-05-08 NOTE — OB RN INTRAOPERATIVE NOTE - NSSELHIDDEN_OBGYN_ALL_OB_FT
[NS_DeliveryAttending1_OBGYN_ALL_OB_FT:[id number]],[NS_DeliveryAssist1_OBGYN_ALL_OB_FT:[id number]],[NS_DeliveryRN_OBGYN_ALL_OB_FT:[id number]] [NS_DeliveryAttending1_OBGYN_ALL_OB_FT:[id number]],[NS_DeliveryAssist1_OBGYN_ALL_OB_FT:[id number]],[NS_DeliveryRN_OBGYN_ALL_OB_FT:[id number]],[NS_DeliveryAttending2_OBGYN_ALL_OB_FT:[id number]],[NS_DeliveryAssist2_OBGYN_ALL_OB_FT:[id number]]

## 2022-05-08 NOTE — OB RN INTRAOPERATIVE NOTE - NS_ELECTROCAUTCOAG_OBGYN_ALL_OB_FT
Comprehensive Nutrition Assessment    Type and Reason for Visit:  Reassess    Nutrition Recommendations/Plan:   1. Continue Osmolite 1.2 (standard without fiber) at 25mL/hr with a water bolus of 150mL Q6H  2. If rate advancement is desired, recommend: Osmolite 1.2 at 70mL/hr to provide 1680mL volume, 2016 calories, 93g protein, 1378mL free water. 3.  Recommend water bolus 160mL Q6H.  4.  Ensure head of bed is 30 - 45 degrees during continuous or bolus gastric feeding and for one hour after bolus. Turn off the feeding if head of bed is lowered less than 30 degrees. 5. Monitor for tolerance (bowel habits, N/V, cramping, abdominal exam findings: distended, firm, tense, guarded, discomfort). Nutrition Assessment:  Pt continues to be intubated and receiving propofol at 26.3 mL per hour to provide 694 calories from fat daily. Pt on Levophed at 2mcg/min. EN support was started 2/6 at 25mL/hr per MD, keep rate per MD pending after EEG 2/8 plan of care. Continue TF at 25mL/hr and monitor for any changes per plan of care. Malnutrition Assessment:  Malnutrition Status:  No malnutrition          Estimated Daily Nutrient Needs:  Energy (kcal):  5070-9472(05-58 kemal/82kgs); Weight Used for Energy Requirements:  Admission(82kg)     Protein (g):  (1.2-2.0g/82kg);  Weight Used for Protein Requirements:  Admission        Fluid (ml/day):   ; Method Used for Fluid Requirements:  1 ml/kcal      Nutrition Related Findings:  +1.96L, seizure activity, no edema No BMs noted      Wounds:  None       Current Nutrition Therapies:    Current Tube Feeding (TF) Orders:  · Feeding Route: Orogastric  · Formula: Standard without Fiber  · Schedule: Continuous  · Water Flushes: 150mL free water flush Q6H  · Current TF & Flush Orders Provides: 600mL volume, 720 calories, 33g protein, 492mL free water  · Goal TF & Flush Orders Provides: as above      Anthropometric Measures:  · Height: 5' 7\" (170.2 cm)  · Current Body Weight: 186 lb (84.4 kg)   · Admission Body Weight: 182 lb (82.6 kg)      · Ideal Body Weight: 148 lbs; % Ideal Body Weight 125.7 %   · BMI: 29.1  · Adjusted Body Weight:  ; No Adjustment   · BMI Categories: Overweight (BMI 25.0-29. 9)       Nutrition Diagnosis:   · Inadequate oral intake related to impaired respiratory function as evidenced by intubation      Nutrition Interventions:   Food and/or Nutrient Delivery:  Continue Current Tube Feeding  Nutrition Education/Counseling:  Education not indicated   Coordination of Nutrition Care:  Continue to monitor while inpatient    Goals:  Pt will mckenzie TF at goal rate       Nutrition Monitoring and Evaluation:   Behavioral-Environmental Outcomes:  None Identified   Food/Nutrient Intake Outcomes:  Enteral Nutrition Intake/Tolerance  Physical Signs/Symptoms Outcomes:  Hemodynamic Status, GI Status     Discharge Planning:     Too soon to determine     Electronically signed by [facility] on 2/8/21 at 12:02 PM EST    Contact: [phone]

## 2022-05-08 NOTE — OB PROVIDER DELIVERY SUMMARY - NSSELHIDDEN_OBGYN_ALL_OB_FT
[NS_DeliveryAttending1_OBGYN_ALL_OB_FT:[id number]],[NS_DeliveryAssist1_OBGYN_ALL_OB_FT:[id number]],[NS_DeliveryRN_OBGYN_ALL_OB_FT:[id number]],[NS_DeliveryAttending2_OBGYN_ALL_OB_FT:[id number]],[NS_DeliveryAssist2_OBGYN_ALL_OB_FT:[email or web address]=]

## 2022-05-08 NOTE — DISCHARGE NOTE OB - MEDICATION SUMMARY - MEDICATIONS TO TAKE
I will START or STAY ON the medications listed below when I get home from the hospital:    acetaminophen 325 mg oral tablet  -- 3 tab(s) by mouth every 6 hours, As Needed  -- Indication: For  delivery delivered    ibuprofen 600 mg oral tablet  -- 1 tab(s) by mouth every 6 hours, As Needed  -- Indication: For  delivery delivered    Synthroid 175 mcg (0.175 mg) oral tablet  -- 1 tab(s) by mouth once a day 5 days per week Monday - Friday   -- Indication: For  delivery delivered   I will START or STAY ON the medications listed below when I get home from the hospital:    ibuprofen 600 mg oral tablet  -- 1 tab(s) by mouth every 6 hours, As Needed  -- Indication: For  delivery delivered    acetaminophen 325 mg oral tablet  -- 3 tab(s) by mouth every 6 hours, As Needed  -- Indication: For Pain    ferrous sulfate 325 mg (65 mg elemental iron) oral tablet  -- 1 tab(s) by mouth once a day  -- Indication: For anemia    Synthroid 175 mcg (0.175 mg) oral tablet  -- 1 tab(s) by mouth once a day 5 days per week Monday - Friday   -- Indication: For  delivery delivered    ascorbic acid 500 mg oral tablet  -- 1 tab(s) by mouth once a day  -- Indication: For nutrition

## 2022-05-08 NOTE — OB NEONATOLOGY/PEDIATRICIAN DELIVERY SUMMARY - NSPEDSNEONOTESA_OBGYN_ALL_OB_FT
Called to delivery for CODE 100 of 39wk male born via rCS to a 35 y/o  mother.  Maternal history of thyroid cancer s/p thyroidectomy (on synthroid). Maternal labs include Blood Type ___ , HIV - , RPR - , Hep B[ - ], GBS +/- _____, COVID-. *ROM at __ with clear/meconium fluids. Delayed cord clamping ____. Baby emerged vigorous, crying, was w/d/s/s with APGARS of **/** . Mom plans to initiate breastfeeding/formula feed, consents/declines Hep B vaccine and consents/declines circ.  EOS ___.    :  TOB: Called to delivery for CODE 100 of 39wk male born via rCS to a 35 y/o  mother.  Maternal history of thyroid cancer s/p thyroidectomy (on synthroid). Maternal labs include Blood Type A+, HIV - , RPR - , Hep B[ - ], GBS  (untreated), COVID pending. AROM at delivery with clear fluids. Baby emerged with poor tone, color and weak respiratory effort. CPAP 5 21% applied by RN at ~30sec of life for 2 minutes. CODE 100 called at ~30sec of life and peds arrived at 2 MOL. At 3 MOL,  weaned to RA with appropriate saturations. w/d/s/s with APGARS of 4/9 . Mom plans to initiate breastfeeding/formula feed, consents Hep B vaccine and consents circ.  EOS not applicable, highest maternal temp-36.8    CODE 100 attended by Dr. Gutierrez

## 2022-05-08 NOTE — DISCHARGE NOTE OB - MATERIALS PROVIDED
Vaccinations/[facility]  Screening Program/  Immunization Record/Breastfeeding Mother’s Support Group Information/Guide to Postpartum Care/[facility] Hearing Screen Program/Shaken Baby Prevention Handout/Birth Certificate Instructions/Tdap Vaccination (VIS Pub Date: 2012)

## 2022-05-08 NOTE — OB RN INTRAOPERATIVE NOTE - NS_SPECIMENS_OBGYN_ALL_OB
Spoke with patient.  States that she used a q-tip and getting black out.  Advised not to put anything into ears.     Still having pain,  Huddled with NP.  To continue heating pad, ibuprofen and Flonase.  Follow up with ENT.    Patient agreed to plan.    Michael Tomas, RN, BSN         No

## 2022-05-08 NOTE — OB RN PATIENT PROFILE - PACKS PER DAY
PSYCHIATRIC DISCHARGE SUMMARY         IDENTIFICATION:    Patient Name  Alirio Rizvi   Date of Birth 1999   [facility] [id number]   Medical Record Number  [id number]      Age  24 y.o. PCP Joel Rodriguez MD   Admit date:  2/21/2021    Discharge date: 2/25/2021   Room Number  326/02  @ [facility]   Date of Service  2/25/2021            TYPE OF DISCHARGE: REGULAR               CONDITION AT DISCHARGE: improved       PROVISIONAL & DISCHARGE DIAGNOSES:    Problem List  Date Reviewed: 1/12/2021          Codes Class    Major depression ICD-10-CM: F32.9  ICD-9-CM: 296.20         * (Principal) Major depressive disorder, recurrent episode, severe ([facility] [address] ICD-10-CM: F33.2  ICD-9-CM: 296.33         Laceration of left upper extremity ICD-10-CM: S41.112A  ICD-9-CM: 884.0         Depression ICD-10-CM: F32.9  ICD-9-CM: 511         BMI 20.0-20.9, adult ICD-10-CM: Z68.20  ICD-9-CM: V85.1         Fibromyalgia ICD-10-CM: M79.7  ICD-9-CM: 729.1         Accidental overdose ICD-10-CM: T50.901A  ICD-9-CM: 977.9, E858.9         Anxiety disorder ICD-10-CM: F41.9  ICD-9-CM: 300.00     Overview Signed 7/7/2017  1:39 PM by Chaparrita Zeng MD     [facility], Dr. Bill Mayes             PTSD (post-traumatic stress disorder) ICD-10-CM: F68.12  ICD-9-CM: 309.81     Overview Addendum 8/18/2017  3:22 PM by Chaparrita Zeng MD     [facility], Dr. Bill Mayes; Intensive-In-Home Services through [facility] and [employer].              Refractive error ICD-10-CM: H52.7  ICD-9-CM: 367.9         Acne vulgaris ICD-10-CM: L70.0  ICD-9-CM: 706.1         Dental caries ICD-10-CM: K02.9  ICD-9-CM: 521.00     Overview Signed 7/7/2017  2:19 PM by MD Tom Lazaro Smiles             Constipation by delayed colonic transit ICD-10-CM: K59.01  ICD-9-CM: 564.01               Active Hospital Problems    *Major depressive disorder, recurrent episode, severe (HCC)      Fibromyalgia      PTSD (post-traumatic stress disorder)      Anxiety disorder        DISCHARGE DIAGNOSIS:   Axis I:  SEE ABOVE  Axis II: SEE ABOVE  Axis III: SEE ABOVE  Axis IV:  lack of structure  Axis V:  <50 on admission, 55+ on discharge     CC & HISTORY OF PRESENT ILLNESS:  24 y.o. WHITE male with a past psychiatric history significant for anxiety and depression brought to hospital after he had overdosed on 8 tablets of 0.1mg Clonidine. Reports this was triggered by an argument with his twin brother whom her lives with. Says brother witnessed him take the tablets after which he made superficial scratches on his forearm. Did not believe he could die from OD and the intent was to rest.  Reports 2 previous OD's. Denies ongoing SI and expresses regret for OD. Denies AH/VH. Lives wit brother and says they are estranged from their parents. Describes living situation as \"rough. \"  Has not been fully adherent to his medications.  Says he forgets to take them     SOCIAL HISTORY:    Social History     Socioeconomic History    Marital status: SINGLE     Spouse name: Not on file    Number of children: Not on file    Years of education: Not on file    Highest education level: Not on file   Occupational History    Not on file   Social Needs    Financial resource strain: Not on file    Food insecurity     Worry: Not on file     Inability: Not on file    Transportation needs     Medical: Not on file     Non-medical: Not on file   Tobacco Use    Smoking status: Never Smoker    Smokeless tobacco: Never Used   Substance and Sexual Activity    Alcohol use: No    Drug use: Not Currently     Types: Marijuana     Comment: used before not today    Sexual activity: Never   Lifestyle    Physical activity     Days per week: Not on file     Minutes per session: Not on file    Stress: Not on file   Relationships    Social connections     Talks on phone: Not on file     Gets together: Not on file     Attends Sabianism service: Not on file     Active member of club or organization: Not on file     Attends meetings of clubs or organizations: Not on file     Relationship status: Not on file    Intimate partner violence     Fear of current or ex partner: Not on file     Emotionally abused: Not on file     Physically abused: Not on file     Forced sexual activity: Not on file   Other Topics Concern    Not on file   Social History Narrative    ** Merged History Encounter **           FAMILY HISTORY:   Family History   Problem Relation Age of Onset    Diabetes Mother     Elevated Lipids Mother     Hypertension Mother     Other Mother         fibromyalgia    Thyroid Disease Father     Diabetes Maternal Grandmother     Elevated Lipids Maternal Grandmother     Hypertension Maternal Grandmother     Other Maternal Grandmother 77        bowel obstruction    Diabetes Paternal Grandmother     Cancer Paternal Grandmother 61        lung             HOSPITALIZATION COURSE:    Abram May was admitted to the inpatient psychiatric unit [facility] for acute psychiatric stabilization in regards to symptomatology as described in the HPI above. The differential diagnosis at time of admission included: schizophrenia vs substance induced psychotic disorder schizoaffective vs bipolar vs adjustment disorder. While on the unit Abram May was involved in individual, group, occupational and milieu therapy. Psychiatric medications were adjusted during this hospitalization. Abram May demonstrated a progressive improvement in overall condition. Much of patient's initial presentation appeared to be related to situational stressors, effects of medication non-compliance and psychological factors. Please see individual progress notes for more specific details regarding patient's hospitalization course. Abram May reports feeling better than on admission and moods are fair. Denies SI/HI/AH/VH. No aggression or violence. Appropriately interactive and aware. Tolerating medications well. Eating and sleeping fairly. Patient with request for discharge today. There are no grounds to seek a TDO. At time of discharge, Roby Meza is without significant problems of depression, psychosis, or mary grace. Patient free of suicidal and homicidal ideations (appears to be at very low risk of suicide or homicide) and reports many positive predictive factors in terms of not attempting suicide or homicide. Overall presentation at time of discharge is most consistent with the diagnosis of Major Depressive Disorder Recurrent severe without psychosis. Patient has maximized benefit to be derived from acute inpatient psychiatric treatment. All members of the treatment team concur with each other in regards to plans for discharge today. Patient and family are aware and in agreement with discharge and discharge plan.          LABS AND IMAGAING:    Labs Reviewed   SALICYLATE - Abnormal; Notable for the following components:       Result Value    Salicylate level <2.8 (*)     All other components within normal limits   ACETAMINOPHEN - Abnormal; Notable for the following components:    Acetaminophen level <2 (*)     All other components within normal limits   METABOLIC PANEL, COMPREHENSIVE - Abnormal; Notable for the following components:    Glucose 125 (*)     BUN/Creatinine ratio 11 (*)     AST (SGOT) 14 (*)     All other components within normal limits   URINALYSIS W/ REFLEX CULTURE - Abnormal; Notable for the following components:    Appearance CLOUDY (*)     All other components within normal limits   COVID-19 RAPID TEST   SARS-COV-2, PCR   CBC WITH AUTOMATED DIFF   DRUG SCREEN, URINE   ETHYL ALCOHOL   SARS-COV-2     Lab Results   Component Value Date/Time    Phenobarbital <2.1 (L) 02/26/2016 09:41 PM     Admission on 02/21/2021   Component Date Value Ref Range Status    Ventricular Rate 02/21/2021 67  BPM Final    Atrial Rate 02/21/2021 67  BPM Final    P-R Interval 02/21/2021 154 ms Final    QRS Duration 02/21/2021 96  ms Final    Q-T Interval 02/21/2021 374  ms Final    QTC Calculation (Bezet) 02/21/2021 395  ms Final    Calculated P Axis 02/21/2021 51  degrees Final    Calculated R Axis 02/21/2021 23  degrees Final    Calculated T Axis 02/21/2021 2  degrees Final    Diagnosis 02/21/2021    Final                    Value:Sinus rhythm with marked sinus arrhythmia  Moderate voltage criteria for LVH, may be normal variant  Nonspecific ST abnormality  Abnormal ECG  When compared with ECG of 01-JAN-2020 00:45,  No significant change was found  Confirmed by Roxane Dun, M.D., Cam Hatchet ([id number]) on 2/22/2021 1:13:33 AM      Salicylate level 16/40/5313 <1.7* 2.8 - 20.0 MG/DL Final    Acetaminophen level 02/21/2021 <2* 10 - 30 ug/mL Final    Sodium 02/21/2021 138  136 - 145 mmol/L Final    Potassium 02/21/2021 3.7  3.5 - 5.1 mmol/L Final    Chloride 02/21/2021 104  97 - 108 mmol/L Final    CO2 02/21/2021 28  21 - 32 mmol/L Final    Anion gap 02/21/2021 6  5 - 15 mmol/L Final    Glucose 02/21/2021 125* 65 - 100 mg/dL Final    BUN 02/21/2021 11  6 - 20 MG/DL Final    Creatinine 02/21/2021 0.98  0.70 - 1.30 MG/DL Final    BUN/Creatinine ratio 02/21/2021 11* 12 - 20   Final    GFR est AA 02/21/2021 >60  >60 ml/min/1.73m2 Final    GFR est non-AA 02/21/2021 >60  >60 ml/min/1.73m2 Final    Calcium 02/21/2021 8.9  8.5 - 10.1 MG/DL Final    Bilirubin, total 02/21/2021 0.2  0.2 - 1.0 MG/DL Final    ALT (SGPT) 02/21/2021 29  12 - 78 U/L Final    AST (SGOT) 02/21/2021 14* 15 - 37 U/L Final    Alk.  phosphatase 02/21/2021 59  45 - 117 U/L Final    Protein, total 02/21/2021 7.0  6.4 - 8.2 g/dL Final    Albumin 02/21/2021 3.8  3.5 - 5.0 g/dL Final    Globulin 02/21/2021 3.2  2.0 - 4.0 g/dL Final    A-G Ratio 02/21/2021 1.2  1.1 - 2.2   Final    WBC 02/21/2021 7.5  4.1 - 11.1 K/uL Final    RBC 02/21/2021 4.93  4.10 - 5.70 M/uL Final    HGB 02/21/2021 14.3  12.1 - 17.0 g/dL Final    HCT 02/21/2021 41.7  36.6 - 50.3 % Final    MCV 02/21/2021 84.6  80.0 - 99.0 FL Final    MCH 02/21/2021 29.0  26.0 - 34.0 PG Final    MCHC 02/21/2021 34.3  30.0 - 36.5 g/dL Final    RDW 02/21/2021 12.4  11.5 - 14.5 % Final    PLATELET 06/74/4589 446  150 - 400 K/uL Final    MPV 02/21/2021 10.5  8.9 - 12.9 FL Final    NRBC 02/21/2021 0.0  0  WBC Final    ABSOLUTE NRBC 02/21/2021 0.00  0.00 - 0.01 K/uL Final    NEUTROPHILS 02/21/2021 60  32 - 75 % Final    LYMPHOCYTES 02/21/2021 29  12 - 49 % Final    MONOCYTES 02/21/2021 8  5 - 13 % Final    EOSINOPHILS 02/21/2021 2  0 - 7 % Final    BASOPHILS 02/21/2021 1  0 - 1 % Final    IMMATURE GRANULOCYTES 02/21/2021 0  0.0 - 0.5 % Final    ABS. NEUTROPHILS 02/21/2021 4.6  1.8 - 8.0 K/UL Final    ABS. LYMPHOCYTES 02/21/2021 2.2  0.8 - 3.5 K/UL Final    ABS. MONOCYTES 02/21/2021 0.6  0.0 - 1.0 K/UL Final    ABS. EOSINOPHILS 02/21/2021 0.1  0.0 - 0.4 K/UL Final    ABS. BASOPHILS 02/21/2021 0.0  0.0 - 0.1 K/UL Final    ABS. IMM.  GRANS. 02/21/2021 0.0  0.00 - 0.04 K/UL Final    DF 02/21/2021 AUTOMATED    Final    AMPHETAMINES 02/21/2021 Negative  NEG   Final    BARBITURATES 02/21/2021 Negative  NEG   Final    BENZODIAZEPINES 02/21/2021 Negative  NEG   Final    COCAINE 02/21/2021 Negative  NEG   Final    METHADONE 02/21/2021 Negative  NEG   Final    OPIATES 02/21/2021 Negative  NEG   Final    PCP(PHENCYCLIDINE) 02/21/2021 Negative  NEG   Final    THC (TH-CANNABINOL) 02/21/2021 Negative  NEG   Final    Drug screen comment 02/21/2021 (NOTE)   Final    Color 02/21/2021 YELLOW/STRAW    Final    Appearance 02/21/2021 CLOUDY* CLEAR   Final    Specific gravity 02/21/2021 1.025  1.003 - 1.030   Final    pH (UA) 02/21/2021 7.0  5.0 - 8.0   Final    Protein 02/21/2021 Negative  NEG mg/dL Final    Glucose 02/21/2021 Negative  NEG mg/dL Final    Ketone 02/21/2021 Negative  NEG mg/dL Final    Bilirubin 02/21/2021 Negative  NEG   Final    Blood 02/21/2021 Negative  NEG   Final    Urobilinogen 02/21/2021 1.0  0.2 - 1.0 EU/dL Final    Nitrites 02/21/2021 Negative  NEG   Final    Leukocyte Esterase 02/21/2021 Negative  NEG   Final    WBC 02/21/2021 0-4  0 - 4 /hpf Final    RBC 02/21/2021 0-5  0 - 5 /hpf Final    Epithelial cells 02/21/2021 FEW  FEW /lpf Final    Bacteria 02/21/2021 Negative  NEG /hpf Final    UA:UC IF INDICATED 02/21/2021 CULTURE NOT INDICATED BY UA RESULT  CNI   Final    ALCOHOL(ETHYL),SERUM 02/21/2021 <10  <10 MG/DL Final    SARS-CoV-2 02/21/2021 Please find results under separate order    Final    Specimen source 02/21/2021 Nasopharyngeal    Final    COVID-19 rapid test 02/21/2021 Not detected  NOTD   Final    Specimen source 02/21/2021 Nasopharyngeal    Final    SARS-CoV-2 02/21/2021 Not detected  NOTD   Final     No results found. DISPOSITION:    Home. Patient to f/u with psychiatric, and psychotherapy appointments. Patient is to f/u with internist as directed. FOLLOW-UP CARE:    Activity as tolerated  Regular diet  Wound Care: none needed. Follow-up Information     Follow up With Specialties Details Why Contact Info    [address] [facility] [address] on 3/2/2021 Please attend your appointment with Nahed Chappell on 3/2/21 at 1:00 [facility] [place]   [phone]    Magi Nicole, [address] [place] [address]  [phone]                   PROGNOSIS:   Fair ---- based on nature of patient's pathology/ies and treatment compliance issues. Prognosis is greatly dependent upon patient's ability to remain sober and to follow up with scheduled appointments as well as to comply with psychiatric medications as prescribed.             DISCHARGE MEDICATIONS:     Informed consent given for the use of following psychotropic medications:  Current Discharge Medication List      CONTINUE these medications which have NOT CHANGED    Details busPIRone (BUSPAR) 10 mg tablet Take 2 Tabs by mouth two (2) times a day. Qty: 360 Tab, Refills: 0      traZODone (DESYREL) 50 mg tablet Take 1 Tab by mouth nightly as needed for Sleep. Qty: 90 Tab, Refills: 0      cloNIDine HCL (CATAPRES) 0.2 mg tablet Take 1 Tab by mouth nightly. Indications: PTSD adjunct  Qty: 90 Tab, Refills: 0      FLUoxetine (PROzac) 20 mg capsule Take 3 Caps by mouth daily. Qty: 270 Cap, Refills: 0                    A coordinated, multidisplinary treatment team round was conducted with Travon Camara is done daily here at [facility]. This team consists of the nurse, psychiatric unit pharmacist,  and Corina Castañeda. I have spent greater than 35 minutes on discharge work.     Signed:  Rex Osgood, MD  2/25/2021 0.1

## 2022-05-08 NOTE — DISCHARGE NOTE OB - PATIENT PORTAL LINK FT
You can access the FollowMyHealth Patient Portal offered by [facility] by registering at the following website: [email or web address]. By joining [facility]’s FollowMyHealth portal, you will also be able to view your health information using other applications (apps) compatible with our system.

## 2022-05-08 NOTE — DISCHARGE NOTE OB - MEDICATION SUMMARY - MEDICATIONS TO STOP TAKING
I will STOP taking the medications listed below when I get home from the hospital:    Synthroid 200 mcg (0.2 mg) oral tablet  -- 1 tab(s) by mouth once a day 2 days / week   Sat - Sun

## 2022-05-08 NOTE — DISCHARGE NOTE OB - NS MD DC FALL RISK RISK
For information on Fall & Injury Prevention, visit: [email or web address] OR  [email or web address] OR  https://www.cdc.gov/steadi/patient.html

## 2022-05-08 NOTE — OB RN PATIENT PROFILE - FALL HARM RISK - UNIVERSAL INTERVENTIONS
Bed in lowest position, wheels locked, appropriate side rails in place/Call bell, personal items and telephone in reach/Instruct patient to call for assistance before getting out of bed or chair/Non-slip footwear when patient is out of bed/[place] to call system/Physically safe environment - no spills, clutter or unnecessary equipment/Purposeful Proactive Rounding/Room/bathroom lighting operational, light cord in reach

## 2022-05-08 NOTE — OB PROVIDER H&P - HISTORY OF PRESENT ILLNESS
37yo  @39w1d presents for repeat C/S  +FM. -VB. -LOF. -Ctx.  EFW: 3200g  GBS: neg    OBHx:   2018 - C/S after attempted vacuum-assisted VD, 41w GA, 3667g  2017 - SAB 8w GA cytotec + D/C  ~ - TOP D/C  GynHx: denies  PMHx: Thyroid cx ()  SHx: Thyroidectomy (), breast augmentation (~), C/S ()    Meds: synthroid 175 mcg M-F 200 mcg S-S, PNV  All: denies  Psych: denies A/D  Social: denies T/A/D

## 2022-05-08 NOTE — OB RN PATIENT PROFILE - NSICDXPASTMEDICALHX_GEN_ALL_CORE_FT
PAST MEDICAL HISTORY:  Acquired hypothyroidism     H/O:  2018    Papillary thyroid carcinoma     Thyroid goiter

## 2022-05-08 NOTE — DISCHARGE NOTE OB - CARE PROVIDER_API CALL
Mariposa Carranza (DO)  Obstetrics and Gynecology  [address] [place] [address]  Phone: [phone]  Fax: [phone]  Established Patient  Follow Up Time: 2 weeks

## 2022-05-08 NOTE — DISCHARGE NOTE OB - CARE PLAN
1 Principal Discharge DX:	 delivery delivered  Assessment and plan of treatment:	recovery  RTO 2 week PP for routine PP visit and incision check

## 2022-05-08 NOTE — OB RN PREOPERATIVE CHECKLIST - ASSESSMENT, HISTORY & PHYSICAL COMPLETED AND ON MEDICAL RECORD
Amada Pittman was at outside lab completing her PN labs that I ordered yesterday and they had question if 5 panel drug screen was sufficient-I said yes  done

## 2022-05-08 NOTE — OB RN DELIVERY SUMMARY - NSSELHIDDEN_OBGYN_ALL_OB_FT
[NS_DeliveryAttending1_OBGYN_ALL_OB_FT:[id number]],[NS_DeliveryAssist1_OBGYN_ALL_OB_FT:[id number]],[NS_DeliveryRN_OBGYN_ALL_OB_FT:[id number]],[NS_DeliveryAttending2_OBGYN_ALL_OB_FT:[id number]],[NS_DeliveryAssist2_OBGYN_ALL_OB_FT:[id number]]

## 2022-05-08 NOTE — OB PROVIDER DELIVERY SUMMARY - NSPROVIDERDELIVERYNOTE_OBGYN_ALL_OB_FT
Chief Complaint   Patient presents with   • Allergies        TIME SEEN: 1855    HPI: This is a 21-year-old male who presents emergency department with complaints of allergic reaction.  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  He received a duoneb in route and symptoms resolved.  Denies difficulty breathing or swallowing at this time.  Notes seasonal allergies.    Review of Systems   Constitutional: Negative for fever.   HENT: Positive for congestion and sore throat.    Eyes: Negative.    Respiratory: Negative for cough and shortness of breath.    Cardiovascular: Negative for chest pain.   Gastrointestinal: Negative for abdominal pain, diarrhea, nausea and vomiting.   Endocrine: Negative.    Genitourinary: Negative for dysuria, flank pain and urgency.   Musculoskeletal: Negative for myalgias.   Skin: Negative.    Neurological: Negative for dizziness, syncope, weakness and headaches.   Hematological: Negative.    Psychiatric/Behavioral: Negative.    All other systems reviewed and are negative.       PAST MEDICAL HISTORY:  No known problems  Immunizations: UTD    PAST SURGICAL HISTORY:  There is no previous surgical history on file.    SOCIAL HISTORY:Patient lives with family.     No family history on file.  Review of patient's family status indicates:  No family status on file        Vitals:    12/28/21 1823 12/28/21 1825 12/28/21 1830   BP: (!) 147/80  139/83   BP Location: RUE - Right upper extremity     Patient Position: Sitting     Pulse: (!) 110  100   Resp: 18  18   Temp: 100 °F (37.8 °C)  99.5 °F (37.5 °C)   TempSrc: Temporal  Oral   SpO2: 100% 100% 100%   Weight: 90.7 kg (200 lb)     Height: 6' 1\" (1.854 m)         Physical Exam  Vitals and nursing note reviewed.   HENT:      Head: Normocephalic and atraumatic.      Right Ear: Tympanic membrane normal.      Left Ear: Tympanic membrane  normal.      Nose: Congestion present.      Mouth/Throat:      Mouth: Mucous membranes are moist.      Comments: No erythema to posterior pharynx.  No tongue or uvular swelling.  Eyes:      Pupils: Pupils are equal, round, and reactive to light.   Cardiovascular:      Rate and Rhythm: Normal rate and regular rhythm.      Heart sounds: Normal heart sounds.   Pulmonary:      Effort: Pulmonary effort is normal. No respiratory distress.      Breath sounds: Normal breath sounds.   Abdominal:      Palpations: Abdomen is soft.      Tenderness: There is no abdominal tenderness.   Musculoskeletal:         General: Normal range of motion.      Cervical back: Normal range of motion.   Skin:     General: Skin is warm and dry.      Comments: No rash   Neurological:      Mental Status: He is alert and oriented to person, place, and time.      Cranial Nerves: No cranial nerve deficit.   Psychiatric:         Mood and Affect: Mood and affect normal.              No results found for this visit on 12/28/21.     Imaging Results    None           Medications - No data to display            MDM:  Patient seems to of had a mild allergic reaction.  Feeling better now.  Declines any more meds needed at this time.  Did give Rx for prednisone for home and encourage him to take a anti-histamine.  Encouraged to return with concerns.  Discharged in good condition.       ED Diagnosis        Final diagnosis    Allergic reaction, initial encounter                 Jenni Reeves MD  [address] [place] [id number] [phone]    Call in 1 day           Summary of your Discharge Medications      Take these Medications      Details   predniSONE 20 MG tablet  Commonly known as: DELTASONE   Take 2 tablets by mouth daily.             DISPOSITION: Home         Anastasiya Rankin CNP  12/28/21 2015  Dr. Adalberto Maldonado agrees with the treatment and plan     Adalberto Maldonado DO  12/28/21 3890     Patient brought to OR for [facility]  LSTCS incision made,  spontaneously verted to transverse back up presentation, arm delivered first, returned arm to uterine cavity  bandage scissors used to T upwards toward the fundus  R  leg and foot grasped to bring  into breech presentation, Dr. Chamberlain delivered  breech   handed immediately to awaiting pediatricians, called to delivery  uterus exteriorized, noted to have lateral extension with complete avulsion of adenxa and broad ligament laceration, no hematoma noted  modified o'leary performed on R side with good hemostasis  hysterotomy closed in 2 layers, 1st with 1 vicryl suture and then with 1 caprosyn   additional interrupted 1 caprosyn and 0 vicryl sutures used to achieve hemostasis  thorough inspection of hysterotomy and adnexa reveal adequate hemostasis  uterus returned to pelvis  fibrillar x 2 and surgicel placed   remainder of closure uncomplicated  Stat CBC revealed Hgb 10.1, CRNA reported patient requiring increased use of pressors, 1 U PRBC given intraop  pending coags  will follow up 4 hr repeat CBC/coags + BMP  detailed debrief conducted with patient in OR, all questions answered, would recommend no future pregnancies due to complexities of this hysterotomy repair, patient reports completed childbearing  will further discuss contraception in the office  all questions answered  patient transferred to PACU in stable condition

## 2022-05-09 LAB
BASOPHILS # BLD AUTO: 0.02 K/UL — SIGNIFICANT CHANGE UP (ref 0–0.2)
BASOPHILS NFR BLD AUTO: 0.2 % — SIGNIFICANT CHANGE UP (ref 0–2)
EOSINOPHIL # BLD AUTO: 0.03 K/UL — SIGNIFICANT CHANGE UP (ref 0–0.5)
EOSINOPHIL NFR BLD AUTO: 0.3 % — SIGNIFICANT CHANGE UP (ref 0–6)
HCT VFR BLD CALC: 28.9 % — LOW (ref 34.5–45)
HGB BLD-MCNC: 10 G/DL — LOW (ref 11.5–15.5)
IANC: 9.69 K/UL — HIGH (ref 1.8–7.4)
IMM GRANULOCYTES NFR BLD AUTO: 0.4 % — SIGNIFICANT CHANGE UP (ref 0–1.5)
LYMPHOCYTES # BLD AUTO: 1.55 K/UL — SIGNIFICANT CHANGE UP (ref 1–3.3)
LYMPHOCYTES # BLD AUTO: 13 % — SIGNIFICANT CHANGE UP (ref 13–44)
MCHC RBC-ENTMCNC: 31.1 PG — SIGNIFICANT CHANGE UP (ref 27–34)
MCHC RBC-ENTMCNC: 34.6 GM/DL — SIGNIFICANT CHANGE UP (ref 32–36)
MCV RBC AUTO: 89.8 FL — SIGNIFICANT CHANGE UP (ref 80–100)
MONOCYTES # BLD AUTO: 0.57 K/UL — SIGNIFICANT CHANGE UP (ref 0–0.9)
MONOCYTES NFR BLD AUTO: 4.8 % — SIGNIFICANT CHANGE UP (ref 2–14)
NEUTROPHILS # BLD AUTO: 9.69 K/UL — HIGH (ref 1.8–7.4)
NEUTROPHILS NFR BLD AUTO: 81.3 % — HIGH (ref 43–77)
NRBC # BLD: 0 /100 WBCS — SIGNIFICANT CHANGE UP
NRBC # FLD: 0 K/UL — SIGNIFICANT CHANGE UP
PLATELET # BLD AUTO: 190 K/UL — SIGNIFICANT CHANGE UP (ref 150–400)
RBC # BLD: 3.22 M/UL — LOW (ref 3.8–5.2)
RBC # FLD: 12.7 % — SIGNIFICANT CHANGE UP (ref 10.3–14.5)
T PALLIDUM AB TITR SER: NEGATIVE — SIGNIFICANT CHANGE UP
WBC # BLD: 11.91 K/UL — HIGH (ref 3.8–10.5)
WBC # FLD AUTO: 11.91 K/UL — HIGH (ref 3.8–10.5)

## 2022-05-09 RX ORDER — LEVOTHYROXINE SODIUM 125 MCG
1 TABLET ORAL
Qty: 0 | Refills: 0 | DISCHARGE

## 2022-05-09 RX ORDER — IBUPROFEN 200 MG
600 TABLET ORAL EVERY 6 HOURS
Refills: 0 | Status: DISCONTINUED | OUTPATIENT
Start: 2022-05-09 | End: 2022-05-10

## 2022-05-09 RX ORDER — ACETAMINOPHEN 500 MG
3 TABLET ORAL
Qty: 0 | Refills: 0 | DISCHARGE
Start: 2022-05-09

## 2022-05-09 RX ORDER — IBUPROFEN 200 MG
1 TABLET ORAL
Qty: 0 | Refills: 0 | DISCHARGE
Start: 2022-05-09

## 2022-05-09 RX ORDER — SENNA PLUS 8.6 MG/1
2 TABLET ORAL AT BEDTIME
Refills: 0 | Status: DISCONTINUED | OUTPATIENT
Start: 2022-05-09 | End: 2022-05-10

## 2022-05-09 RX ADMIN — Medication 975 MILLIGRAM(S): at 21:45

## 2022-05-09 RX ADMIN — Medication 975 MILLIGRAM(S): at 21:12

## 2022-05-09 RX ADMIN — Medication 600 MILLIGRAM(S): at 19:22

## 2022-05-09 RX ADMIN — Medication 975 MILLIGRAM(S): at 09:50

## 2022-05-09 RX ADMIN — Medication 600 MILLIGRAM(S): at 18:30

## 2022-05-09 RX ADMIN — Medication 30 MILLIGRAM(S): at 13:02

## 2022-05-09 RX ADMIN — HEPARIN SODIUM 5000 UNIT(S): 5000 INJECTION INTRAVENOUS; SUBCUTANEOUS at 12:36

## 2022-05-09 RX ADMIN — Medication 30 MILLIGRAM(S): at 04:48

## 2022-05-09 RX ADMIN — Medication 975 MILLIGRAM(S): at 09:00

## 2022-05-09 RX ADMIN — Medication 30 MILLIGRAM(S): at 12:36

## 2022-05-09 RX ADMIN — Medication 30 MILLIGRAM(S): at 04:04

## 2022-05-09 NOTE — PROGRESS NOTE ADULT - PROBLEM SELECTOR PLAN 1
- Continue regular diet.  - Increase ambulation.  - Continue motrin, tylenol, oxycodone PRN for pain control.  - F/u AM CBC    Charlette Wynn, PGY-1  Pager# [phone]

## 2022-05-10 VITALS
TEMPERATURE: 99 F | DIASTOLIC BLOOD PRESSURE: 67 MMHG | RESPIRATION RATE: 18 BRPM | HEART RATE: 91 BPM | OXYGEN SATURATION: 100 % | SYSTOLIC BLOOD PRESSURE: 115 MMHG

## 2022-05-10 LAB
BASOPHILS # BLD AUTO: 0.04 K/UL — SIGNIFICANT CHANGE UP (ref 0–0.2)
BASOPHILS NFR BLD AUTO: 0.5 % — SIGNIFICANT CHANGE UP (ref 0–2)
EOSINOPHIL # BLD AUTO: 0.19 K/UL — SIGNIFICANT CHANGE UP (ref 0–0.5)
EOSINOPHIL NFR BLD AUTO: 2.4 % — SIGNIFICANT CHANGE UP (ref 0–6)
HCT VFR BLD CALC: 25.9 % — LOW (ref 34.5–45)
HGB BLD-MCNC: 8.6 G/DL — LOW (ref 11.5–15.5)
IANC: 5.3 K/UL — SIGNIFICANT CHANGE UP (ref 1.8–7.4)
IMM GRANULOCYTES NFR BLD AUTO: 0.4 % — SIGNIFICANT CHANGE UP (ref 0–1.5)
LYMPHOCYTES # BLD AUTO: 1.94 K/UL — SIGNIFICANT CHANGE UP (ref 1–3.3)
LYMPHOCYTES # BLD AUTO: 24.1 % — SIGNIFICANT CHANGE UP (ref 13–44)
MCHC RBC-ENTMCNC: 30.3 PG — SIGNIFICANT CHANGE UP (ref 27–34)
MCHC RBC-ENTMCNC: 33.2 GM/DL — SIGNIFICANT CHANGE UP (ref 32–36)
MCV RBC AUTO: 91.2 FL — SIGNIFICANT CHANGE UP (ref 80–100)
MONOCYTES # BLD AUTO: 0.54 K/UL — SIGNIFICANT CHANGE UP (ref 0–0.9)
MONOCYTES NFR BLD AUTO: 6.7 % — SIGNIFICANT CHANGE UP (ref 2–14)
NEUTROPHILS # BLD AUTO: 5.3 K/UL — SIGNIFICANT CHANGE UP (ref 1.8–7.4)
NEUTROPHILS NFR BLD AUTO: 65.9 % — SIGNIFICANT CHANGE UP (ref 43–77)
NRBC # BLD: 0 /100 WBCS — SIGNIFICANT CHANGE UP
NRBC # FLD: 0 K/UL — SIGNIFICANT CHANGE UP
PLATELET # BLD AUTO: 184 K/UL — SIGNIFICANT CHANGE UP (ref 150–400)
RBC # BLD: 2.84 M/UL — LOW (ref 3.8–5.2)
RBC # FLD: 13.1 % — SIGNIFICANT CHANGE UP (ref 10.3–14.5)
WBC # BLD: 8.04 K/UL — SIGNIFICANT CHANGE UP (ref 3.8–10.5)
WBC # FLD AUTO: 8.04 K/UL — SIGNIFICANT CHANGE UP (ref 3.8–10.5)

## 2022-05-10 RX ORDER — FERROUS SULFATE 325(65) MG
325 TABLET ORAL DAILY
Refills: 0 | Status: DISCONTINUED | OUTPATIENT
Start: 2022-05-10 | End: 2022-05-10

## 2022-05-10 RX ORDER — ASCORBIC ACID 60 MG
1 TABLET,CHEWABLE ORAL
Qty: 0 | Refills: 0 | DISCHARGE
Start: 2022-05-10

## 2022-05-10 RX ORDER — ASCORBIC ACID 60 MG
500 TABLET,CHEWABLE ORAL DAILY
Refills: 0 | Status: DISCONTINUED | OUTPATIENT
Start: 2022-05-10 | End: 2022-05-10

## 2022-05-10 RX ORDER — FERROUS SULFATE 325(65) MG
1 TABLET ORAL
Qty: 0 | Refills: 0 | DISCHARGE
Start: 2022-05-10

## 2022-05-10 RX ADMIN — Medication 600 MILLIGRAM(S): at 00:31

## 2022-05-10 RX ADMIN — Medication 600 MILLIGRAM(S): at 06:46

## 2022-05-10 RX ADMIN — Medication 600 MILLIGRAM(S): at 06:00

## 2022-05-10 RX ADMIN — HEPARIN SODIUM 5000 UNIT(S): 5000 INJECTION INTRAVENOUS; SUBCUTANEOUS at 12:16

## 2022-05-10 RX ADMIN — Medication 600 MILLIGRAM(S): at 12:15

## 2022-05-10 RX ADMIN — HEPARIN SODIUM 5000 UNIT(S): 5000 INJECTION INTRAVENOUS; SUBCUTANEOUS at 00:32

## 2022-05-10 RX ADMIN — Medication 1 TABLET(S): at 12:15

## 2022-05-10 RX ADMIN — Medication 975 MILLIGRAM(S): at 09:55

## 2022-05-10 RX ADMIN — Medication 975 MILLIGRAM(S): at 09:08

## 2022-05-10 RX ADMIN — Medication 600 MILLIGRAM(S): at 01:10

## 2022-05-10 RX ADMIN — Medication 325 MILLIGRAM(S): at 12:16

## 2022-05-10 RX ADMIN — Medication 175 MICROGRAM(S): at 06:00

## 2022-05-10 RX ADMIN — Medication 500 MILLIGRAM(S): at 12:15

## 2022-05-10 NOTE — CHART NOTE - NSCHARTNOTEFT_GEN_A_CORE
Patient cleared for pp discharge today, per MD Carranza.     Discharge to home and follow up @ [facility] in 2 weeks for incision check.     WENDY Hester

## 2022-05-10 NOTE — PROGRESS NOTE ADULT - SUBJECTIVE AND OBJECTIVE BOX
OB Progress Note:  Delivery, POD#1    S: 32yo POD#1 s/p rCCS for NRFHT c/b R uterine artery ligation. Her pain is well controlled. She is tolerating a regular diet. Not yet passing flatus. Denies N/V. Denies CP/SOB/lightheadedness/dizziness. She is ambulating without difficulty.       O:   Vital Signs Last 24 Hrs  T(C): 36.6 (08 May 2022 20:59), Max: 36.8 (08 May 2022 07:39)  T(F): 97.9 (08 May 2022 20:59), Max: 98.24 (08 May 2022 07:39)  HR: 77 (08 May 2022 20:59) (67 - 94)  BP: 120/73 (08 May 2022 20:59) (92/52 - 126/72)  BP(mean): 79 (08 May 2022 17:00) (62 - 81)  RR: 16 (08 May 2022 20:59) (12 - 23)  SpO2: 100% (08 May 2022 20:59) (97% - 100%)    Labs:  Blood type: A Positive  Rubella IgG: RPR: Negative                          11.8   17.48<H> >-----------< 176    (  @ 16:22 )             33.8<L>                        10.1<L>   10.91<H> >-----------< 175    (  @ 11:44 )             29.8<L>                        11.6   7.20 >-----------< 184    (  @ 07:56 )             33.6<L>    22 @ 16:22      136  |  109<H>  |  8   ----------------------------<  101<H>  4.2   |  19<L>  |  0.45<L>        Ca    8.3<L>      08 May 2022 16:22    TPro  5.4<L>  /  Alb  2.9<L>  /  TBili  0.8  /  DBili  x   /  AST  20  /  ALT  10  /  AlkPhos  135<H>  22 @ 16:22        PE:  General: NAD  Abdomen: Mildly distended, appropriately tender, Fundus firm, incision c/d/i.  VE: No heavy vaginal bleeding  Extremities: No erythema, no pitting edema  
Pain Service Follow-up  Postop Day  1    S/P  C- Section    T(C): 36.5 (05-09-22 @ 10:00), Max: 36.9 (05-09-22 @ 05:00)  HR: 87 (05-09-22 @ 10:00) (67 - 99)  BP: 117/61 (05-09-22 @ 10:00) (92/52 - 120/73)  RR: 18 (05-09-22 @ 10:00) (12 - 23)  SpO2: 100% (05-09-22 @ 10:00) (97% - 100%)  Wt(kg): --      THERAPY:  Spinal Morphine     Sedation Score:	  [X] Alert	      [  ] Drowsy       [  ] Arousable	[  ] Asleep         [  ] Unresponsive    Side Effects:	  [X] None	      [  ] Nausea       [  ] Pruritus        [  ] Weakness   [  ] Numbness        ASSESSMENT/ PLAN   [ X ] Discontinue         [  ] Continue    [ X ] Documentation and Verification of current medications       Satisfactory Post Anesthetic Course    
OB Progress Note: LTCS, POD#2    S: 37yo POD#2 s/p  rCCS c/bright uterine artery ligation. Pain is well controlled. She is tolerating a regular diet and passing flatus. She is voiding spontaneously, and ambulating without difficulty. Denies CP/SOB. Denies lightheadedness/dizziness. Denies N/V.    O:  Vitals:  Vital Signs Last 24 Hrs  T(C): 36.6 (10 May 2022 05:45), Max: 36.7 (09 May 2022 14:00)  T(F): 97.9 (10 May 2022 05:45), Max: 98 (09 May 2022 14:00)  HR: 79 (10 May 2022 05:45) (79 - 98)  BP: 117/74 (10 May 2022 05:45) (115/58 - 118/72)  BP(mean): --  RR: 17 (10 May 2022 05:45) (16 - 18)  SpO2: 100% (10 May 2022 05:45) (100% - 100%)    MEDICATIONS  (STANDING):  acetaminophen     Tablet .. 975 milliGRAM(s) Oral <User Schedule>  diphtheria/tetanus/pertussis (acellular) Vaccine (ADAcel) 0.5 milliLiter(s) IntraMuscular once  ferrous    sulfate 325 milliGRAM(s) Oral daily  heparin   Injectable 5000 Unit(s) SubCutaneous every 12 hours  ibuprofen  Tablet. 600 milliGRAM(s) Oral every 6 hours  levothyroxine 175 MICROGram(s) Oral daily  prenatal multivitamin 1 Tablet(s) Oral daily  senna 2 Tablet(s) Oral at bedtime      MEDICATIONS  (PRN):  diphenhydrAMINE 25 milliGRAM(s) Oral every 6 hours PRN Pruritus  lanolin Ointment 1 Application(s) Topical every 6 hours PRN Sore Nipples  magnesium hydroxide Suspension 30 milliLiter(s) Oral two times a day PRN Constipation  oxyCODONE    IR 5 milliGRAM(s) Oral every 3 hours PRN Moderate to Severe Pain (4-10)  oxyCODONE    IR 5 milliGRAM(s) Oral once PRN Moderate to Severe Pain (4-10)  simethicone 80 milliGRAM(s) Chew every 4 hours PRN Gas      Labs:  Blood type: A Positive  Rubella IgG: RPR: Negative                          8.6<L>   8.04 >-----------< 184    ( 05-10 @ 06:07 )             25.9<L>                        10.0<L>   11.91<H> >-----------< 190    ( 05-09 @ 06:25 )             28.9<L>                        11.8   17.48<H> >-----------< 176    ( 05-08 @ 16:22 )             33.8<L>                        10.1<L>   10.91<H> >-----------< 175    ( 05-08 @ 11:44 )             29.8<L>                        11.6   7.20 >-----------< 184    ( 05-08 @ 07:56 )             33.6<L>    05-08-22 @ 16:22      136  |  109<H>  |  8   ----------------------------<  101<H>  4.2   |  19<L>  |  0.45<L>        Ca    8.3<L>      08 May 2022 16:22    TPro  5.4<L>  /  Alb  2.9<L>  /  TBili  0.8  /  DBili  x   /  AST  20  /  ALT  10  /  AlkPhos  135<H>  05-08-22 @ 16:22          PE:  General: NAD  Abdomen: Soft, appropriately tender, incision c/d/i.  Extremities: No erythema, no pitting edema    
Progress Note:   · Provider Specialty	OB      · Subjective and Objective:   OB Progress Note:  Delivery, POD#1    S: 34yo POD#1 s/p rCCS for NRFHT c/b R uterine artery ligation. Her pain is well controlled. She is tolerating a regular diet. Not yet passing flatus. Denies N/V. Denies CP/SOB/lightheadedness/dizziness. She is ambulating without difficulty.       O:   Vital Signs Last 24 Hrs  T(C): 36.9 (09 May 2022 05:00), Max: 36.9 (09 May 2022 05:00)  T(F): 98.4 (09 May 2022 05:00), Max: 98.4 (09 May 2022 05:00)  HR: 99 (09 May 2022 05:00) (67 - 99)  BP: 100/77 (09 May 2022 05:00) (92/52 - 120/73)  BP(mean): 79 (08 May 2022 17:00) (62 - 81)  RR: 16 (09 May 2022 05:00) (12 - 23)  SpO2: 99% (09 May 2022 05:00) (97% - 100%)  Labs:  Blood type: A Positive  Rubella IgG: RPR: Negative                          11.8   17.48<H> >-----------< 176    (  @ 16:22 )             33.8<L>                        10.1<L>   10.91<H> >-----------< 175    (  @ 11:44 )             29.8<L>                        11.6   7.20 >-----------< 184    (  @ 07:56 )             33.6<L>    22 @ 16:22      136  |  109<H>  |  8   ----------------------------<  101<H>  4.2   |  19<L>  |  0.45<L>        Ca    8.3<L>      08 May 2022 16:22    TPro  5.4<L>  /  Alb  2.9<L>  /  TBili  0.8  /  DBili  x   /  AST  20  /  ALT  10  /  AlkPhos  135<H>  22 @ 16:22        PE:  General: NAD  Abdomen: Mildly distended, appropriately tender, Fundus firm, incision c/d/i.  VE: No heavy vaginal bleeding  Extremities: No erythema, no pitting edema      Assessment and Plan:   · Assessment	  A/P: 34yo POD#1 s/p rCCS with T incision for NRFHT c/b R uterine artery ligation. QBL 1525. Patient is stable and doing well post-operatively.    #Elevated QBL   - Hct: 33/6->29.8->1u pRBC->33.8  - d/c Pagan this AM, UOP adequate , awaiting void  - encouraged ambulation  - inc PO hydration  - VSS     #PP Care   - Continue regular diet  - Increase ambulation  - Continue motrin, tylenol, oxycodone PRN for pain control.    - reviewed CBC         Problem/Plan - 1:  ·  Problem:  delivery delivered.   ·  Plan: - Continue regular diet.  - Increase ambulation.  - Continue motrin, tylenol, oxycodone PRN for pain control.  - cont PNV, iron, vitamin C, Synthroid  - Reviewed CBC

## 2022-05-10 NOTE — PROGRESS NOTE ADULT - ASSESSMENT
A/P: 35yo POD#2 s/p rCCS.  Patient is stable and doing well post-operatively.      R UA ligation  -f/u AM CBC  -patient is s/p 1upRBC    post partum  - Continue regular diet.  - Increase ambulation.  - Continue motrin, tylenol, oxycodone PRN for pain control.     Madeleine Pantoja pgy-1
A/P: 34yo POD#1 s/p rCCS with T incision for NRFHT c/b R uterine artery ligation. QBL 1525. Patient is stable and doing well post-operatively.    #Elevated QBL   - Hct: 33/6->29.8->1u pRBC->33.8  - d/c Pagan this AM, UOP adequate   - VSS     #PP Care   - Continue regular diet  - Increase ambulation  - Continue motrin, tylenol, oxycodone PRN for pain control.    - f/u AM CBC    Lisa Nixon, PGY1

## 2022-05-10 NOTE — PROGRESS NOTE ADULT - ATTENDING COMMENTS
agree w/ above A/P  acute blood loss anemia 2/2 PPH due to intraop bleeding  Hgb today 8.6  encourage PO hydration/diet, ambulation, adequate pain control  discharge planning

## 2023-10-20 NOTE — DISCHARGE NOTE OB - EXPOSE INCISION TO AIR AS MUCH AS POSSIBLE
Statement Selected MEDICATIONS  (PRN):  albuterol    90 MICROgram(s) HFA Inhaler 2 Puff(s) Inhalation every 6 hours PRN asthma  aluminum hydroxide/magnesium hydroxide/simethicone Suspension 30 milliLiter(s) Oral every 8 hours PRN Dyspepsia  dextrose Oral Gel 15 Gram(s) Oral once PRN Blood Glucose LESS THAN 70 milliGRAM(s)/deciliter  haloperidol     Tablet 5 milliGRAM(s) Oral every 6 hours PRN agitation  haloperidol    Injectable 5 milliGRAM(s) IntraMuscular once PRN combative behavior  LORazepam     Tablet 1 milliGRAM(s) Oral every 6 hours PRN Agitation  polyethylene glycol 3350 17 Gram(s) Oral daily PRN Constipation

## 2023-11-16 NOTE — ED ADULT TRIAGE NOTE - NSTRIAGECARE_GEN_A_ER
"Subjective:   Gem Barnett is a 62 y.o. female who presents for Eye Problem (Feels like stye in left eye- making vision foggy. Feels painful. )    Patient is a 62-year-old female presenting clinic today with 2 to 3-week history of left eye on the vision, swelling noted on upper eyelid that is possibly a stye, orbital pain.  She states that has been progressively worsening.  She denies any blackening of her vision, fever, chills, eye redness, or discharge.  She has been using over-the-counter eyedrops for stye treatment without any symptom relief.  She states that the stye may have ruptured several days ago and feels that the fogginess is gotten better however the pain around her high has worsened.  She did go to the [facility] optometrist who did not see her and told her to come to the urgent care.  She does not wear contact lenses.  She has not had any injury or trauma to the eye.  Patient is a type II diabetic and states that she is well controlled.  Previously has been on Ozempic.    Patient is also requesting refill of her escitalopram.  She states she has been on this medication for many years for treatment of recurrent major depressive disorder.  And has tolerated it well.  She denies any suicidal homicidal ideations today  Medications, Allergies, and current problem list reviewed today in Epic.     Objective:     /70   Pulse 78   Temp 36.4 °C (97.5 °F) (Temporal)   Resp 16   Ht 1.702 m (5' 7\")   Wt 70.3 kg (155 lb)   SpO2 97%     Physical Exam  Vitals reviewed.   Constitutional:       General: She is not in acute distress.     Appearance: Normal appearance. She is not ill-appearing or toxic-appearing.   HENT:      Head: Normocephalic.      Nose: Nose normal.      Mouth/Throat:      Mouth: Mucous membranes are moist.   Eyes:      General: Lids are normal. Lids are everted, no foreign bodies appreciated. Vision grossly intact. Gaze aligned appropriately.         Right eye: No discharge or hordeolum.   "       Left eye: No discharge or hordeolum.      Extraocular Movements: Extraocular movements intact.      Right eye: No nystagmus.      Left eye: No nystagmus.      Conjunctiva/sclera: Conjunctivae normal.      Right eye: Right conjunctiva is not injected. No chemosis or exudate.     Left eye: Left conjunctiva is not injected. No chemosis or exudate.     Pupils: Pupils are equal, round, and reactive to light.      Comments: Pain reported when palpating around left eye orbit.   Cardiovascular:      Rate and Rhythm: Normal rate.   Pulmonary:      Effort: Pulmonary effort is normal.   Musculoskeletal:         General: Normal range of motion.      Cervical back: Normal range of motion and neck supple.   Lymphadenopathy:      Cervical: No cervical adenopathy.   Skin:     General: Skin is warm and dry.   Neurological:      Mental Status: She is alert and oriented to person, place, and time.   Psychiatric:         Mood and Affect: Mood normal.         Behavior: Behavior normal.         Thought Content: Thought content normal.         Judgment: Judgment normal.         Assessment/Plan:     Diagnosis and associated orders:     1. Moderate episode of recurrent major depressive disorder (HCC)  escitalopram (LEXAPRO) 20 MG tablet      2. Eye infection, right  tobramycin (TOBREX) 0.3 % Solution ophthalmic solution         Comments/MDM:     1. Moderate episode of recurrent major depressive disorder (HCC)  Chronic and stable condition.  Refill of Lexapro sent to pharmacy.  Highly recommended patient following up with primary care provider for further refills.  Discussed coping skills.  - escitalopram (LEXAPRO) 20 MG tablet; Take 1 tablet by mouth once daily  Dispense: 90 Tablet; Refill: 0    2. Eye infection, right  This is acute condition.  Etiology unknown.  Thought vision may be due to recent rupture of stye on her eyelid.  No stye is observed today in clinic.  Also discussed about possible side effect due to Ozempic.   Recommended patient follow-up with an optometrist within the next week for thorough eye exam as we do not have the capabilities here in clinic in the urgent care.  Prescription of tobramycin eyedrops were sent to pharmacy, side effects medication were discussed.  Recommended patient keep all other drops or ointments out of her eye.  - tobramycin (TOBREX) 0.3 % Solution ophthalmic solution; Administer 1 Drop into the right eye every 4 hours for 10 days.  Dispense: 3 mL; Refill: 0             Differential diagnosis, natural history, supportive care, and indications for immediate follow-up discussed.    Advised the patient to follow-up with the primary care physician for recheck, reevaluation, and consideration of further management.    I personally reviewed prior external notes and test results pertinent to today's visit as well as additional imaging and testing completed in clinic today.     Please note that this dictation was created using voice recognition software. I have made a reasonable attempt to correct obvious errors, but I expect that there are errors of grammar and possibly content that I did not discover before finalizing the note.       Face Mask

## 2024-05-22 NOTE — OB RN PATIENT PROFILE - VISION (WITH CORRECTIVE LENSES IF THE PATIENT USUALLY WEARS THEM):
Anesthesia Post Evaluation    Patient: Neyda Claire    Procedure(s) Performed: * No procedures listed *    Final Anesthesia Type: general (The pt was Not Arousable even with painful stimulation during procedure)      Patient location during evaluation: GI PACU ([facility])  Patient participation: Yes- Able to Participate  Level of consciousness: awake and alert  Post-procedure vital signs: reviewed and stable  Pain management: adequate  Airway patency: patent    PONV status at discharge: No PONV  Anesthetic complications: no      Cardiovascular status: blood pressure returned to baseline, hemodynamically stable and stable  Respiratory status: unassisted  Hydration status: euvolemic  Follow-up not needed.  Comments: Refer to nursing note for pain/aidan score upon discharge from recovery.               Vitals Value Taken Time   /49 05/21/24 1210   Temp 36.6 °C (97.8 °F) 05/21/24 1141   Pulse 74 05/21/24 1210   Resp 12 05/21/24 1210   SpO2 97 % 05/21/24 1210         Event Time   Out of Recovery 12:14:25         Pain/Aidan Score: Aidan Score: 10 (5/21/2024 11:54 AM)           Normal vision: sees adequately in most situations; can see medication labels, newsprint
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
